# Patient Record
Sex: FEMALE | Race: WHITE | NOT HISPANIC OR LATINO | Employment: FULL TIME | URBAN - METROPOLITAN AREA
[De-identification: names, ages, dates, MRNs, and addresses within clinical notes are randomized per-mention and may not be internally consistent; named-entity substitution may affect disease eponyms.]

---

## 2022-10-25 ENCOUNTER — HOSPITAL ENCOUNTER (EMERGENCY)
Facility: HOSPITAL | Age: 22
Discharge: HOME | End: 2022-10-25
Attending: FAMILY MEDICINE
Payer: COMMERCIAL

## 2022-10-25 VITALS
BODY MASS INDEX: 26.43 KG/M2 | HEART RATE: 80 BPM | HEIGHT: 61 IN | DIASTOLIC BLOOD PRESSURE: 70 MMHG | TEMPERATURE: 98 F | SYSTOLIC BLOOD PRESSURE: 114 MMHG | RESPIRATION RATE: 16 BRPM | WEIGHT: 140 LBS | OXYGEN SATURATION: 98 %

## 2022-10-25 DIAGNOSIS — J02.9 VIRAL PHARYNGITIS: Primary | ICD-10-CM

## 2022-10-25 LAB
FLUAV AG UPPER RESP QL IA.RAPID: NOT DETECTED
FLUBV AG UPPER RESP QL IA.RAPID: NOT DETECTED
SARS-COV-2 RNA RESP QL NAA+PROBE: NOT DETECTED
STREP A PCR (OHS): NOT DETECTED

## 2022-10-25 PROCEDURE — 87651 STREP A DNA AMP PROBE: CPT | Performed by: NURSE PRACTITIONER

## 2022-10-25 PROCEDURE — 0241U COVID/FLU A&B PCR: CPT | Performed by: NURSE PRACTITIONER

## 2022-10-25 PROCEDURE — 99282 EMERGENCY DEPT VISIT SF MDM: CPT

## 2022-10-25 NOTE — DISCHARGE INSTRUCTIONS
Tylenol and ibuprofen for pain if needed. Warm salt water gargles. Increase vitamin C up to 3000mg per day. Zyrtec and/or claritin daily. Over the counter medicine for symptoms relief as needed.

## 2022-10-25 NOTE — ED PROVIDER NOTES
Encounter Date: 10/25/2022       History     Chief Complaint   Patient presents with    Sore Throat     Sore throat & ? fever     23 y/o female presents with sore throat and nasal congestion. No fever. No cough. Symptoms Sunday night/Monday morning. Around others in home who have similar symptoms but have all tested negative.     The history is provided by the patient. No  was used.   Sore Throat   This is a new problem. The sore throat symptoms include sore throat.The current episode started two days ago. The problem has been unchanged. There has been no fever. Associated symptoms include congestion. She has tried nothing for the symptoms. The treatment provided no relief.   Review of patient's allergies indicates:  No Known Allergies  Past Medical History:   Diagnosis Date    Depression      No past surgical history on file.  No family history on file.     Review of Systems   HENT:  Positive for congestion and sore throat.    All other systems reviewed and are negative.    Physical Exam     Initial Vitals [10/25/22 1342]   BP Pulse Resp Temp SpO2   114/70 80 16 98.1 °F (36.7 °C) 98 %      MAP       --         Physical Exam    Nursing note and vitals reviewed.  Constitutional: She appears well-developed and well-nourished.   HENT:   Right Ear: Tympanic membrane and ear canal normal.   Left Ear: Tympanic membrane and ear canal normal.   Mouth/Throat: No oropharyngeal exudate, posterior oropharyngeal edema, posterior oropharyngeal erythema or tonsillar abscesses.   +post nasal drainage   Eyes: Conjunctivae are normal.   Cardiovascular:  Normal rate, regular rhythm and normal heart sounds.           Pulmonary/Chest: Breath sounds normal. No respiratory distress.     Neurological: She is alert and oriented to person, place, and time.   Skin: Skin is warm and dry.   Psychiatric: She has a normal mood and affect.       ED Course   Procedures  Labs Reviewed   COVID/FLU A&B PCR - Normal    Narrative:      The Xpert Xpress SARS-CoV-2/FLU/RSV plus is a rapid, multiplexed real-time PCR test intended for the simultaneous qualitative detection and differentiation of SARS-CoV-2, Influenza A, Influenza B, and respiratory syncytial virus (RSV) viral RNA in either nasopharyngeal swab or nasal swab specimens.         STREP GROUP A BY PCR - Normal    Narrative:     The Xpert Xpress Strep A test is a rapid, qualitative in vitro diagnostic test for the detection of Streptococcus pyogenes (Group A ß-hemolytic Streptococcus, Strep A) in throat swab specimens from patients with signs and symptoms of pharyngitis.            Imaging Results    None          Medications - No data to display  Medical Decision Making:   Clinical Tests:   Lab Tests: Ordered and Reviewed  Additional MDM:   Differential Diagnosis:   Other: The following diagnoses were also considered and will be evaluated: URI, strep and covid.                       Clinical Impression:   Final diagnoses:  [J02.9] Viral pharyngitis (Primary)      ED Disposition Condition    Discharge Stable          ED Prescriptions    None       Follow-up Information       Follow up With Specialties Details Why Contact Info    primary care provider  Call in 1 week As needed, If symptoms worsen              KENNY Bueno  10/25/22 8829

## 2023-04-30 ENCOUNTER — HOSPITAL ENCOUNTER (EMERGENCY)
Facility: HOSPITAL | Age: 23
Discharge: HOME | End: 2023-04-30
Attending: EMERGENCY MEDICINE
Payer: COMMERCIAL

## 2023-04-30 VITALS
TEMPERATURE: 98 F | WEIGHT: 140 LBS | SYSTOLIC BLOOD PRESSURE: 93 MMHG | RESPIRATION RATE: 15 BRPM | HEIGHT: 60 IN | OXYGEN SATURATION: 97 % | DIASTOLIC BLOOD PRESSURE: 50 MMHG | HEART RATE: 79 BPM | BODY MASS INDEX: 27.48 KG/M2

## 2023-04-30 DIAGNOSIS — T50.904A INGESTION OF UNKNOWN DRUG, UNDETERMINED INTENT, INITIAL ENCOUNTER: ICD-10-CM

## 2023-04-30 DIAGNOSIS — F19.10 SUBSTANCE ABUSE: Primary | ICD-10-CM

## 2023-04-30 LAB
ABS NEUT CALC (OHS): 4.49 X10(3)/MCL (ref 2.1–9.2)
ALBUMIN SERPL-MCNC: 4.1 G/DL (ref 3.5–5)
ALBUMIN/GLOB SERPL: 1.1 RATIO (ref 1.1–2)
ALP SERPL-CCNC: 98 UNIT/L (ref 40–150)
ALT SERPL-CCNC: 16 UNIT/L (ref 0–55)
AMPHET UR QL SCN: NEGATIVE
APAP SERPL-MCNC: <17.4 UG/ML (ref 17.4–30)
APPEARANCE UR: CLEAR
AST SERPL-CCNC: 17 UNIT/L (ref 5–34)
B-HCG SERPL QL: NEGATIVE
BARBITURATE SCN PRESENT UR: NEGATIVE
BENZODIAZ UR QL SCN: NEGATIVE
BILIRUB UR QL STRIP.AUTO: NEGATIVE MG/DL
BILIRUBIN DIRECT+TOT PNL SERPL-MCNC: 0.2 MG/DL
BUN SERPL-MCNC: 13.1 MG/DL (ref 7–18.7)
CALCIUM SERPL-MCNC: 9.5 MG/DL (ref 8.4–10.2)
CANNABINOIDS UR QL SCN: POSITIVE
CHLORIDE SERPL-SCNC: 109 MMOL/L (ref 98–107)
CO2 SERPL-SCNC: 22 MMOL/L (ref 22–29)
COCAINE UR QL SCN: NEGATIVE
COLOR UR AUTO: NORMAL
CREAT SERPL-MCNC: 1.03 MG/DL (ref 0.55–1.02)
EOSINOPHIL NFR BLD MANUAL: 0.13 X10(3)/MCL (ref 0–0.9)
EOSINOPHIL NFR BLD MANUAL: 1 % (ref 0–8)
ERYTHROCYTE [DISTWIDTH] IN BLOOD BY AUTOMATED COUNT: 14.1 % (ref 11.5–17)
ETHANOL SERPL-MCNC: 12 MG/DL
FENTANYL UR QL SCN: NEGATIVE
GFR SERPLBLD CREATININE-BSD FMLA CKD-EPI: >60 MLS/MIN/1.73/M2
GLOBULIN SER-MCNC: 3.8 GM/DL (ref 2.4–3.5)
GLUCOSE SERPL-MCNC: 105 MG/DL (ref 74–100)
GLUCOSE UR QL STRIP.AUTO: NEGATIVE MG/DL
HCT VFR BLD AUTO: 40.2 % (ref 37–47)
HGB BLD-MCNC: 12.8 G/DL (ref 12–16)
HYPOCHROMIA BLD QL SMEAR: ABNORMAL
KETONES UR QL STRIP.AUTO: NEGATIVE MG/DL
LEUKOCYTE ESTERASE UR QL STRIP.AUTO: NEGATIVE UNIT/L
LYMPHOCYTES NFR BLD MANUAL: 54 % (ref 13–40)
LYMPHOCYTES NFR BLD MANUAL: 7.13 X10(3)/MCL
MCH RBC QN AUTO: 27.4 PG (ref 27–31)
MCHC RBC AUTO-ENTMCNC: 31.8 G/DL (ref 33–36)
MCV RBC AUTO: 85.9 FL (ref 80–94)
MDMA UR QL SCN: NEGATIVE
MONOCYTES NFR BLD MANUAL: 1.45 X10(3)/MCL (ref 0.1–1.3)
MONOCYTES NFR BLD MANUAL: 11 % (ref 2–11)
NEUTROPHILS NFR BLD MANUAL: 34 % (ref 47–80)
NITRITE UR QL STRIP.AUTO: NEGATIVE
NRBC BLD AUTO-RTO: 0 %
OPIATES UR QL SCN: NEGATIVE
PCP UR QL: NEGATIVE
PH UR STRIP.AUTO: 6 [PH]
PH UR: 6 [PH] (ref 3–11)
PLATELET # BLD AUTO: 466 X10(3)/MCL (ref 130–400)
PLATELET # BLD EST: ABNORMAL 10*3/UL
PMV BLD AUTO: 9.4 FL (ref 7.4–10.4)
POTASSIUM SERPL-SCNC: 3.1 MMOL/L (ref 3.5–5.1)
PROT SERPL-MCNC: 7.9 GM/DL (ref 6.4–8.3)
PROT UR QL STRIP.AUTO: NEGATIVE MG/DL
RBC # BLD AUTO: 4.68 X10(6)/MCL (ref 4.2–5.4)
RBC MORPH BLD: ABNORMAL
RBC UR QL AUTO: NEGATIVE UNIT/L
SALICYLATES SERPL-MCNC: <5 MG/DL
SODIUM SERPL-SCNC: 142 MMOL/L (ref 136–145)
SP GR UR STRIP.AUTO: >=1.03
STOMATOCYTES (OLG): SLIGHT
UROBILINOGEN UR STRIP-ACNC: 0.2 MG/DL
WBC # SPEC AUTO: 13.2 X10(3)/MCL (ref 4.5–11.5)

## 2023-04-30 PROCEDURE — 81025 URINE PREGNANCY TEST: CPT | Performed by: EMERGENCY MEDICINE

## 2023-04-30 PROCEDURE — 80143 DRUG ASSAY ACETAMINOPHEN: CPT | Performed by: EMERGENCY MEDICINE

## 2023-04-30 PROCEDURE — 25000003 PHARM REV CODE 250: Performed by: EMERGENCY MEDICINE

## 2023-04-30 PROCEDURE — 99285 EMERGENCY DEPT VISIT HI MDM: CPT | Mod: 25

## 2023-04-30 PROCEDURE — 81003 URINALYSIS AUTO W/O SCOPE: CPT | Mod: 59 | Performed by: EMERGENCY MEDICINE

## 2023-04-30 PROCEDURE — 85027 COMPLETE CBC AUTOMATED: CPT | Performed by: EMERGENCY MEDICINE

## 2023-04-30 PROCEDURE — 80179 DRUG ASSAY SALICYLATE: CPT | Performed by: EMERGENCY MEDICINE

## 2023-04-30 PROCEDURE — 96360 HYDRATION IV INFUSION INIT: CPT

## 2023-04-30 PROCEDURE — 80307 DRUG TEST PRSMV CHEM ANLYZR: CPT | Performed by: EMERGENCY MEDICINE

## 2023-04-30 PROCEDURE — 93010 EKG 12-LEAD: ICD-10-PCS | Mod: ,,, | Performed by: INTERNAL MEDICINE

## 2023-04-30 PROCEDURE — 93010 ELECTROCARDIOGRAM REPORT: CPT | Mod: ,,, | Performed by: INTERNAL MEDICINE

## 2023-04-30 PROCEDURE — 80053 COMPREHEN METABOLIC PANEL: CPT | Performed by: EMERGENCY MEDICINE

## 2023-04-30 PROCEDURE — 82077 ASSAY SPEC XCP UR&BREATH IA: CPT | Performed by: EMERGENCY MEDICINE

## 2023-04-30 PROCEDURE — 93005 ELECTROCARDIOGRAM TRACING: CPT

## 2023-04-30 RX ORDER — SODIUM CHLORIDE 9 MG/ML
1000 INJECTION, SOLUTION INTRAVENOUS
Status: COMPLETED | OUTPATIENT
Start: 2023-04-30 | End: 2023-04-30

## 2023-04-30 RX ADMIN — SODIUM CHLORIDE 1000 ML: 9 INJECTION, SOLUTION INTRAVENOUS at 03:04

## 2023-04-30 RX ADMIN — SODIUM CHLORIDE 1000 ML: 9 INJECTION, SOLUTION INTRAVENOUS at 12:04

## 2023-04-30 RX ADMIN — POTASSIUM BICARBONATE 25 MEQ: 977.5 TABLET, EFFERVESCENT ORAL at 04:04

## 2023-04-30 NOTE — ED PROVIDER NOTES
"Encounter Date: 4/30/2023       History     Chief Complaint   Patient presents with    Ingestion     " She ate a gummy." States she does not feel right."     23-year-old female ate a THC gummy off the street and got very sleepy and was not feeling well so her mom brought her in.  On arrival she slumped down in the wheelchair and we had to pick her up in the bed, and she aroused with a sternal rub.  Her mom states that she smokes marijuana regularly so isn't sure why she reacted this way to a THC gummy.  Prior to this happening, she was in her usual state of health and her mom states she is not suicidal and did not take anything in an overdose attempt.      Review of patient's allergies indicates:  No Known Allergies  Past Medical History:   Diagnosis Date    Depression      No past surgical history on file.  No family history on file.     Review of Systems   Neurological:  Positive for syncope.   All other systems reviewed and are negative.    Physical Exam     Initial Vitals [04/30/23 0007]   BP Pulse Resp Temp SpO2   (!) 158/109 (!) 134 18 98 °F (36.7 °C) 100 %      MAP       --         Physical Exam    Nursing note and vitals reviewed.  Constitutional: She appears well-developed and well-nourished. She is not diaphoretic. No distress.   Sedated appearing at presentation which resolved with time   HENT:   Head: Normocephalic and atraumatic.   Mouth/Throat: Oropharynx is clear and moist.   Eyes: Conjunctivae are normal. Pupils are equal, round, and reactive to light.   Neck: Neck supple.   Cardiovascular:  Normal rate, regular rhythm, normal heart sounds and intact distal pulses.           Pulmonary/Chest: Breath sounds normal. No respiratory distress. She has no wheezes. She has no rhonchi. She has no rales.   Abdominal: Abdomen is soft. Bowel sounds are normal. She exhibits no distension. There is no abdominal tenderness. There is no guarding.   Musculoskeletal:         General: No tenderness or edema. Normal " range of motion.      Cervical back: Neck supple.     Neurological: She is alert and oriented to person, place, and time.   Skin: Skin is warm and dry. Capillary refill takes less than 2 seconds. No rash noted.   Psychiatric: She has a normal mood and affect. Thought content normal.       ED Course   Procedures  Labs Reviewed   COMPREHENSIVE METABOLIC PANEL - Abnormal; Notable for the following components:       Result Value    Potassium Level 3.1 (*)     Chloride 109 (*)     Glucose Level 105 (*)     Creatinine 1.03 (*)     Globulin 3.8 (*)     All other components within normal limits   ACETAMINOPHEN LEVEL - Abnormal; Notable for the following components:    Acetaminophen Level <17.4 (*)     All other components within normal limits   ALCOHOL,MEDICAL (ETHANOL) - Abnormal; Notable for the following components:    Ethanol Level 12.0 (*)     All other components within normal limits   CBC WITH DIFFERENTIAL - Abnormal; Notable for the following components:    WBC 13.2 (*)     MCHC 31.8 (*)     Platelet 466 (*)     All other components within normal limits   MANUAL DIFFERENTIAL - Abnormal; Notable for the following components:    Neut Man 34 (*)     Lymph Man 54 (*)     Abs Mono 1.452 (*)     Abs Lymp 7.128 (*)     RBC Morph Abnormal (*)     Hypochrom 1+ (*)     Stomatocytes Slight (*)     Platelet Est Increased (*)     All other components within normal limits   DRUG SCREEN, URINE (BEAKER) - Abnormal; Notable for the following components:    Cannabinoids, Urine Positive (*)     All other components within normal limits    Narrative:     Cut off concentrations:    Amphetamines - 1000 ng/ml  Barbiturates - 200 ng/ml  Benzodiazepine - 200 ng/ml  Cannabinoids (THC) - 50 ng/ml  Cocaine - 300 ng/ml  Fentanyl - 1.0 ng/ml  MDMA - 500 ng/ml  Opiates - 300 ng/ml   Phencyclidine (PCP) - 25 ng/ml    Specimen submitted for drug analysis and tested for pH and specific gravity in order to evaluate sample integrity. Suspect tampering  if specific gravity is <1.003 and/or pH is not within the range of 4.5 - 8.0  False negatives may result form substances such as bleach added to urine.  False positives may result for the presence of a substance with similar chemical structure to the drug or its metabolite.    This test provides only a PRELIMINARY analytical test result. A more specific alternate chemical method must be used in order to obtain a confirmed analytical result. Gas chromatography/mass spectrometry (GC/MS) is the preferred confirmatory method. Other chemical confirmation methods are available. Clinical consideration and professional judgement should be applied to any drug of abuse test result, particularly when preliminary positive results are used.    Positive results will be confirmed only at the physicians request. Unconfirmed screening results are to be used only for medical purposes (treatment).        PREGNANCY TEST, URINE RAPID - Normal   CBC W/ AUTO DIFFERENTIAL    Narrative:     The following orders were created for panel order CBC auto differential.  Procedure                               Abnormality         Status                     ---------                               -----------         ------                     CBC with Differential[[id number]]        Abnormal            Final result               Manual Differential[[id number]]          Abnormal            Final result                 Please view results for these tests on the individual orders.   URINALYSIS   SALICYLATE LEVEL   POCT GLUCOSE MONITORING CONTINUOUS          Imaging Results              X-Ray Chest AP Portable (In process)                   X-Rays:   Independently Interpreted Readings:   Other Readings:  Preliminary reading of the chest x-ray is negative  Medications   0.9%  NaCl infusion (1,000 mLs Intravenous New Bag 4/30/23 0032)   0.9%  NaCl infusion (1,000 mLs Intravenous New Bag 4/30/23 0305)   potassium bicarbonate disintegrating tablet 25 mEq (25  mEq Oral Given 4/30/23 0445)     Medical Decision Making:   Initial Assessment:   23-year-old female ate a THC gummy off the street and got very sleepy and was not feeling well so her mom brought her in.  On arrival she slumped down in the wheelchair and we had to pick her up in the bed, and she aroused with a sternal rub.  Her mom states that she smokes marijuana regularly so isn't sure why she reacted this way to a THC gummy.  Prior to this happening, she was in her usual state of health and her mom states she is not suicidal and did not take anything in an overdose attempt.      Differential Diagnosis:   Differential diagnosis includes but is not limited to accidental drug overdose, substance abuse, intentional drug overdose  Clinical Tests:   Lab Tests: Reviewed  Radiological Study: Reviewed  ED Management:  Patient was seen and evaluated in the emergency department with history, physical exam, lab work, chest x-ray.  She was observed in the emergency department on the telemetry monitor for 6 hours and was found to be stable after receiving 2 L of saline.  She is now ambulatory without assistance, mentation clear, stable for discharge home.  Her mom went home to rest but will be returning to pick her up.           ED Course as of 04/30/23 0607   Sun Apr 30, 2023   0225 Blood pressure 130/70, heart rate 97, O2 sat 96% room air.  Patient is sleeping, arouses with stimulation but goes back to sleep. [SH]   0440 Potassium(!): 3.1  Will give a dose of potassium [SH]   0441 X-Ray Chest AP Portable  CXR negative [SH]   0442 Patient is now ambulatory without assistance to the restroom, mentation and speech is clear, vital signs are stable. [SH]   0603 Patient has been observed in the emergency department now for 6 hours.  She is sleepy but arouses with stimulation and is ambulatory to the restroom without assistance.  She is asking to go home at this time.  Her blood pressure on occasion has been on the low side but she  keeps rolling on her side which may give us a falsely low reading.  She is not symptomatic she walks and at this point she is stable for discharge.  She has repeated multiple times that she took was THC for recreational purposes and she had no intention of self-harm.  She does not seem to think she has a substance abuse problem but give her a list of substance abuse detox facilities. [SH]      ED Course User Index  [SH] Kimber Guardado MD                 Clinical Impression:   Final diagnoses:  [T50.944A] Ingestion of unknown drug, undetermined intent, initial encounter  [F19.10] Substance abuse (Primary)        ED Disposition Condition    Discharge Stable          ED Prescriptions    None       Follow-up Information       Follow up With Specialties Details Why Contact Info    PCP  Schedule an appointment as soon as possible for a visit                Kimber Guardado MD  04/30/23 0607

## 2023-04-30 NOTE — Clinical Note
"Evette Arauzparker Jang was seen and treated in our emergency department on 4/30/2023.  She may return to work on 05/03/2023.       If you have any questions or concerns, please don't hesitate to call.      Kimber Guardado MD"

## 2023-06-05 PROBLEM — F32.A ANXIETY AND DEPRESSION: Status: ACTIVE | Noted: 2023-06-05

## 2023-06-05 PROBLEM — F41.9 ANXIETY AND DEPRESSION: Status: ACTIVE | Noted: 2023-06-05

## 2023-08-10 ENCOUNTER — HOSPITAL ENCOUNTER (EMERGENCY)
Facility: HOSPITAL | Age: 23
Discharge: HOME | End: 2023-08-10
Attending: EMERGENCY MEDICINE
Payer: COMMERCIAL

## 2023-08-10 VITALS
BODY MASS INDEX: 27.48 KG/M2 | HEART RATE: 84 BPM | RESPIRATION RATE: 18 BRPM | OXYGEN SATURATION: 100 % | TEMPERATURE: 98 F | DIASTOLIC BLOOD PRESSURE: 65 MMHG | WEIGHT: 140 LBS | HEIGHT: 60 IN | SYSTOLIC BLOOD PRESSURE: 101 MMHG

## 2023-08-10 DIAGNOSIS — J02.9 VIRAL PHARYNGITIS: Primary | ICD-10-CM

## 2023-08-10 PROCEDURE — 0240U COVID/FLU A&B PCR: CPT | Performed by: EMERGENCY MEDICINE

## 2023-08-10 PROCEDURE — 63600175 PHARM REV CODE 636 W HCPCS: Performed by: EMERGENCY MEDICINE

## 2023-08-10 PROCEDURE — 96372 THER/PROPH/DIAG INJ SC/IM: CPT | Performed by: EMERGENCY MEDICINE

## 2023-08-10 PROCEDURE — 99284 EMERGENCY DEPT VISIT MOD MDM: CPT

## 2023-08-10 PROCEDURE — 87651 STREP A DNA AMP PROBE: CPT | Performed by: EMERGENCY MEDICINE

## 2023-08-10 RX ORDER — PREDNISONE 20 MG/1
60 TABLET ORAL DAILY
Qty: 15 TABLET | Refills: 0 | Status: SHIPPED | OUTPATIENT
Start: 2023-08-10 | End: 2023-08-15

## 2023-08-10 RX ORDER — DEXAMETHASONE SODIUM PHOSPHATE 4 MG/ML
8 INJECTION, SOLUTION INTRA-ARTICULAR; INTRALESIONAL; INTRAMUSCULAR; INTRAVENOUS; SOFT TISSUE
Status: COMPLETED | OUTPATIENT
Start: 2023-08-10 | End: 2023-08-10

## 2023-08-10 RX ADMIN — DEXAMETHASONE SODIUM PHOSPHATE 8 MG: 4 INJECTION, SOLUTION INTRA-ARTICULAR; INTRALESIONAL; INTRAMUSCULAR; INTRAVENOUS; SOFT TISSUE at 11:08

## 2023-08-10 NOTE — Clinical Note
"Evette Jang (Noelle) was seen and treated in our emergency department on 8/10/2023.  She may return to work on 08/14/2023.       If you have any questions or concerns, please don't hesitate to call.       RN    "

## 2023-08-10 NOTE — ED PROVIDER NOTES
Encounter Date: 8/10/2023       History     Chief Complaint   Patient presents with    Sore Throat     Pt complaint of worsening sore throat for 2 days     The history is provided by the patient.   Sore Throat   This is a new problem. The current episode started two days ago. The problem has been gradually worsening. There has been no fever. Associated symptoms include swollen glands. Pertinent negatives include no coughing, hoarse voice or shortness of breath.   Denies known sick contacts.    Review of patient's allergies indicates:  No Known Allergies  Past Medical History:   Diagnosis Date    ADHD (attention deficit hyperactivity disorder)     Anxiety     Depression      Past Surgical History:   Procedure Laterality Date     SECTION       Family History   Problem Relation Age of Onset    Depression Mother     Anxiety disorder Mother     No Known Problems Father     No Known Problems Sister      Social History     Tobacco Use    Smoking status: Never    Smokeless tobacco: Never   Substance Use Topics    Alcohol use: Yes     Comment: occasional    Drug use: Not Currently     Types: Marijuana     Review of Systems   Constitutional:  Negative for fever.   HENT:  Positive for sore throat. Negative for hoarse voice.    Respiratory:  Negative for cough and shortness of breath.    Cardiovascular:  Negative for chest pain.   Gastrointestinal:  Negative for nausea.   Genitourinary:  Negative for dysuria.   Musculoskeletal:  Negative for back pain.   Skin:  Negative for rash.   Neurological:  Negative for weakness.   Hematological:  Does not bruise/bleed easily.       Physical Exam     Initial Vitals [08/10/23 1105]   BP Pulse Resp Temp SpO2   101/65 84 18 98.1 °F (36.7 °C) 100 %      MAP       --         Physical Exam    Nursing note and vitals reviewed.  Constitutional: She appears well-developed and well-nourished.   HENT:   Head: Normocephalic and atraumatic.   Right Ear: External ear normal.   Left Ear:  External ear normal.   Mouth/Throat: Uvula is midline and mucous membranes are normal. Oropharyngeal exudate and posterior oropharyngeal erythema present. No posterior oropharyngeal edema or tonsillar abscesses.       Eyes: Conjunctivae and EOM are normal. Pupils are equal, round, and reactive to light.   Neck: Neck supple.   Normal range of motion.  Cardiovascular:  Normal rate, regular rhythm, normal heart sounds and intact distal pulses.           Pulmonary/Chest: Breath sounds normal.   Abdominal: Abdomen is soft. Bowel sounds are normal.   Musculoskeletal:         General: Normal range of motion.      Cervical back: Normal range of motion and neck supple.     Neurological: She is alert and oriented to person, place, and time. GCS score is 15. GCS eye subscore is 4. GCS verbal subscore is 5. GCS motor subscore is 6.   Skin: Skin is warm and dry. Capillary refill takes less than 2 seconds.   Psychiatric: She has a normal mood and affect. Her behavior is normal. Judgment and thought content normal.         ED Course   Procedures  Labs Reviewed   COVID/FLU A&B PCR - Normal    Narrative:     The Xpert Xpress SARS-CoV-2/FLU/RSV plus is a rapid, multiplexed real-time PCR test intended for the simultaneous qualitative detection and differentiation of SARS-CoV-2, Influenza A, Influenza B, and respiratory syncytial virus (RSV) viral RNA in either nasopharyngeal swab or nasal swab specimens.         STREP GROUP A BY PCR - Normal    Narrative:     The Xpert Xpress Strep A test is a rapid, qualitative in vitro diagnostic test for the detection of Streptococcus pyogenes (Group A ß-hemolytic Streptococcus, Strep A) in throat swab specimens from patients with signs and symptoms of pharyngitis.            Imaging Results    None          Medications   dexAMETHasone injection 8 mg (8 mg Intramuscular Given 8/10/23 1118)         Differential includes:  strep, viral pharyngitis, URI, COVID, flu.  Will swab for flu/COVID/strep and  give IM steroids.                     Clinical Impression:   Final diagnoses:  [J02.9] Viral pharyngitis (Primary)        ED Disposition Condition    Discharge Stable          ED Prescriptions       Medication Sig Dispense Start Date End Date Auth. Provider    predniSONE (DELTASONE) 20 MG tablet Take 3 tablets (60 mg total) by mouth once daily. for 5 days 15 tablet 8/10/2023 8/15/2023 Desmond Umana MD          Follow-up Information       Follow up With Specialties Details Why Contact Info    Follow up with your primary care provider in 2 weeks if not improved                 Desmond Umana MD  08/10/23 121

## 2023-08-16 ENCOUNTER — HOSPITAL ENCOUNTER (EMERGENCY)
Facility: HOSPITAL | Age: 23
Discharge: HOME | End: 2023-08-16
Attending: EMERGENCY MEDICINE
Payer: COMMERCIAL

## 2023-08-16 VITALS
WEIGHT: 139 LBS | DIASTOLIC BLOOD PRESSURE: 80 MMHG | OXYGEN SATURATION: 96 % | RESPIRATION RATE: 18 BRPM | SYSTOLIC BLOOD PRESSURE: 120 MMHG | HEIGHT: 60 IN | BODY MASS INDEX: 27.29 KG/M2 | HEART RATE: 98 BPM | TEMPERATURE: 99 F

## 2023-08-16 DIAGNOSIS — Z20.2 POSSIBLE EXPOSURE TO STD: Primary | ICD-10-CM

## 2023-08-16 DIAGNOSIS — N39.0 URINARY TRACT INFECTION WITHOUT HEMATURIA, SITE UNSPECIFIED: ICD-10-CM

## 2023-08-16 LAB
APPEARANCE UR: ABNORMAL
B-HCG SERPL QL: NEGATIVE
BACTERIA #/AREA URNS AUTO: ABNORMAL /HPF
BILIRUB UR QL STRIP.AUTO: NEGATIVE
COLOR UR: YELLOW
GLUCOSE UR QL STRIP.AUTO: NEGATIVE
KETONES UR QL STRIP.AUTO: NEGATIVE
LEUKOCYTE ESTERASE UR QL STRIP.AUTO: ABNORMAL
NITRITE UR QL STRIP.AUTO: NEGATIVE
PH UR STRIP.AUTO: 7.5 [PH]
PROT UR QL STRIP.AUTO: ABNORMAL
RBC #/AREA URNS AUTO: ABNORMAL /HPF
RBC UR QL AUTO: NEGATIVE
SP GR UR STRIP.AUTO: 1.02
SQUAMOUS #/AREA URNS AUTO: ABNORMAL /HPF
UROBILINOGEN UR STRIP-ACNC: 0.2
WBC #/AREA URNS AUTO: >100 /HPF

## 2023-08-16 PROCEDURE — 81001 URINALYSIS AUTO W/SCOPE: CPT

## 2023-08-16 PROCEDURE — 87088 URINE BACTERIA CULTURE: CPT

## 2023-08-16 PROCEDURE — 87591 N.GONORRHOEAE DNA AMP PROB: CPT

## 2023-08-16 PROCEDURE — 63600175 PHARM REV CODE 636 W HCPCS

## 2023-08-16 PROCEDURE — 96372 THER/PROPH/DIAG INJ SC/IM: CPT

## 2023-08-16 PROCEDURE — 99284 EMERGENCY DEPT VISIT MOD MDM: CPT

## 2023-08-16 PROCEDURE — 81025 URINE PREGNANCY TEST: CPT

## 2023-08-16 RX ORDER — CEPHALEXIN 500 MG/1
500 CAPSULE ORAL EVERY 12 HOURS
Qty: 14 CAPSULE | Refills: 0 | Status: SHIPPED | OUTPATIENT
Start: 2023-08-16 | End: 2023-08-23

## 2023-08-16 RX ORDER — CEFTRIAXONE 1 G/1
0.5 INJECTION, POWDER, FOR SOLUTION INTRAMUSCULAR; INTRAVENOUS
Status: COMPLETED | OUTPATIENT
Start: 2023-08-16 | End: 2023-08-16

## 2023-08-16 RX ADMIN — CEFTRIAXONE SODIUM 0.5 G: 1 INJECTION, POWDER, FOR SOLUTION INTRAMUSCULAR; INTRAVENOUS at 06:08

## 2023-08-16 NOTE — ED PROVIDER NOTES
"Encounter Date: 2023       History     Chief Complaint   Patient presents with    Exposure to STD     C/o possible exposure to STD 7 days ago. Denies current symptoms. "Just want to be checked"     The patient is a 23 y.o. female who presents to the Emergency Department with a chief complaint of possible STD exposure. Patient states that her and her partner have been sleeping with other people. She wants to be checked for STD. She does report urinary frequency and dysuria. She denies vaginal discharge or bleeding. Symptoms began 1 week ago and have been constant since onset. Her pain is currently rated as a 0/10 in severity. Symptoms are aggravated with sexual intercourse and there are no alleviating factors. She reports taking nothing prior to arrival with no relief of symptoms. No other reported symptoms at this time.      The history is provided by the patient. No  was used.   Exposure to STD  This is a new problem. The current episode started more than 2 days ago. The problem occurs daily. The problem has not changed since onset.Pertinent negatives include no chest pain, no abdominal pain, no headaches and no shortness of breath. Nothing aggravates the symptoms. Nothing relieves the symptoms. She has tried nothing for the symptoms. The treatment provided no relief.     Review of patient's allergies indicates:  No Known Allergies  Past Medical History:   Diagnosis Date    ADHD (attention deficit hyperactivity disorder)     Anxiety     Depression      Past Surgical History:   Procedure Laterality Date     SECTION       Family History   Problem Relation Age of Onset    Depression Mother     Anxiety disorder Mother     No Known Problems Father     No Known Problems Sister      Social History     Tobacco Use    Smoking status: Never    Smokeless tobacco: Never   Substance Use Topics    Alcohol use: Yes     Comment: occasional    Drug use: Not Currently     Types: Marijuana     Review " of Systems   Respiratory:  Negative for shortness of breath.    Cardiovascular:  Negative for chest pain.   Gastrointestinal:  Negative for abdominal pain.   Genitourinary:  Positive for dysuria and frequency. Negative for pelvic pain and urgency.   Neurological:  Negative for headaches.   All other systems reviewed and are negative.      Physical Exam     Initial Vitals [08/16/23 1624]   BP Pulse Resp Temp SpO2   120/80 98 18 98.6 °F (37 °C) 96 %      MAP       --         Physical Exam    Nursing note and vitals reviewed.  Constitutional: She appears well-developed and well-nourished.   HENT:   Head: Normocephalic.   Right Ear: Hearing and tympanic membrane normal.   Left Ear: Hearing and tympanic membrane normal.   Mouth/Throat: Uvula is midline, oropharynx is clear and moist and mucous membranes are normal.   Cardiovascular:  Normal rate, regular rhythm, normal heart sounds and normal pulses.           Pulmonary/Chest: Effort normal and breath sounds normal.     Neurological: She is alert. GCS eye subscore is 4. GCS verbal subscore is 5. GCS motor subscore is 6.   Skin: Skin is warm, dry and intact. Capillary refill takes less than 2 seconds.         ED Course   Procedures  Labs Reviewed   URINALYSIS, REFLEX TO URINE CULTURE - Abnormal; Notable for the following components:       Result Value    Appearance, UA SL CLOUDY (*)     Protein, UA Trace (*)     Leukocyte Esterase, UA Moderate (*)     All other components within normal limits   URINALYSIS, MICROSCOPIC - Abnormal; Notable for the following components:    Bacteria, UA Few (*)     WBC, UA >100 (*)     Squamous Epithelial Cells, UA Many (*)     All other components within normal limits   PREGNANCY TEST, URINE RAPID - Normal   CHLAMYDIA/GONORRHOEAE(GC), PCR   CULTURE, URINE          Imaging Results    None          Medications   cefTRIAXone injection 0.5 g (0.5 g Intramuscular Given 8/16/23 2917)     Medical Decision Making:   Initial Assessment:   The patient  is a 23 y.o. female who presents to the Emergency Department with a chief complaint of possible STD exposure. Patient states that her and her partner have been sleeping with other people. She wants to be checked for STD. She does report urinary frequency and dysuria. She denies vaginal discharge or bleeding. Symptoms began 1 week ago and have been constant since onset. Her pain is currently rated as a 0/10 in severity. Symptoms are aggravated with sexual intercourse and there are no alleviating factors. She reports taking nothing prior to arrival with no relief of symptoms. No other reported symptoms at this time.    Differential Diagnosis:   Differential diagnosis include but are not limited to urinary tract infection, STD exposure, chlamydia, gonorrhea  Clinical Tests:   Lab Tests: Ordered and Reviewed  ED Management:  MDM  History obtained by patient.   Co-morbidities and/or factors adding to the complexity or risk for the patient?: none  Differential diagnoses: Differential diagnosis include but are not limited to urinary tract infection, STD exposure, chlamydia, gonorrhea  Decision to obtain previous or outside records?: no  Chart Review (nursing home, outside records, CareEverywhere): none  Review of RX medications/new RX prescribed by me?: keflex  My EKG Interpretation: see above  Labs/imaging/other tests obtained/considered (risk/benefits of testing discussed): UA, upt, gc/ch  Labs/tests intepretation: UA with > 100 wbc, 2+ leukocytes  My independent imaging interpretation: none  Treatment/interventions, IV fluids, IV medications: rocephin  Complex management (IV controlled substances, went to OR, DNR, meds requiring monitoring, transfer, etc)?: none  Workup/treatment affected by social determinants of health?: none   Point of care US done/interpretation: none  Consults/radiologist/EMS/social work/family discussion/alternate history: none  Advanced care planning/end of life discussion: none  Shared decision  making: shared decision making with patient  ETOH/smoking/drug cessation discussion: none  Dispo: discharge home.               ED Course as of 08/16/23 1914   Wed Aug 16, 2023   1749 Patient declined treatment for STDs at this time. States she wants to wait until results come back. She understands that they will not be back today. [LM]   1816 Leukocytes, UA(!): Moderate [LM]   1816 WBC, UA(!): >100 [LM]   1816 Bacteria, UA(!): Few [LM]   1816 Patient prefers to wait on GC/CH results for treatment. Patient does have > 100 WBCs, moderate leukocytes. Will dc w/ antibiotics. Will contact patient if STD return positive.  [LM]      ED Course User Index  [LM] Saida Alas NP                 Clinical Impression:   Final diagnoses:  [Z20.2] Possible exposure to STD (Primary)  [N39.0] Urinary tract infection without hematuria, site unspecified        ED Disposition Condition    Discharge Stable          ED Prescriptions       Medication Sig Dispense Start Date End Date Auth. Provider    cephALEXin (KEFLEX) 500 MG capsule Take 1 capsule (500 mg total) by mouth every 12 (twelve) hours. for 7 days 14 capsule 8/16/2023 8/23/2023 Saida Alas NP          Follow-up Information       Follow up With Specialties Details Why Contact Info    Follow up with the [facility] for STD testing  Schedule an appointment as soon as possible for a visit                Saida Alas NP  08/16/23 1914

## 2023-08-17 LAB
C TRACH DNA SPEC QL NAA+PROBE: DETECTED
N GONORRHOEA DNA SPEC QL NAA+PROBE: DETECTED
SOURCE (OHS): ABNORMAL

## 2023-08-18 RX ORDER — DOXYCYCLINE 100 MG/1
100 CAPSULE ORAL EVERY 12 HOURS
Qty: 14 CAPSULE | Refills: 0 | Status: SHIPPED | OUTPATIENT
Start: 2023-08-18 | End: 2023-08-25

## 2023-08-19 LAB
BACTERIA UR CULT: ABNORMAL
BACTERIA UR CULT: ABNORMAL

## 2023-10-05 ENCOUNTER — HOSPITAL ENCOUNTER (EMERGENCY)
Facility: HOSPITAL | Age: 23
Discharge: HOME | End: 2023-10-05
Attending: EMERGENCY MEDICINE
Payer: COMMERCIAL

## 2023-10-05 VITALS
BODY MASS INDEX: 27.29 KG/M2 | WEIGHT: 139 LBS | HEIGHT: 60 IN | HEART RATE: 101 BPM | SYSTOLIC BLOOD PRESSURE: 111 MMHG | OXYGEN SATURATION: 94 % | TEMPERATURE: 99 F | DIASTOLIC BLOOD PRESSURE: 63 MMHG | RESPIRATION RATE: 18 BRPM

## 2023-10-05 DIAGNOSIS — J20.9 ACUTE BRONCHITIS, UNSPECIFIED ORGANISM: Primary | ICD-10-CM

## 2023-10-05 DIAGNOSIS — R05.9 COUGH: ICD-10-CM

## 2023-10-05 PROCEDURE — 87651 STREP A DNA AMP PROBE: CPT | Performed by: EMERGENCY MEDICINE

## 2023-10-05 PROCEDURE — 0240U COVID/FLU A&B PCR: CPT | Performed by: EMERGENCY MEDICINE

## 2023-10-05 PROCEDURE — 99284 EMERGENCY DEPT VISIT MOD MDM: CPT | Mod: 25

## 2023-10-05 RX ORDER — PREDNISONE 20 MG/1
60 TABLET ORAL DAILY
Qty: 15 TABLET | Refills: 0 | Status: SHIPPED | OUTPATIENT
Start: 2023-10-05 | End: 2023-10-10

## 2023-10-05 RX ORDER — ONDANSETRON 8 MG/1
8 TABLET, ORALLY DISINTEGRATING ORAL EVERY 6 HOURS PRN
Qty: 20 TABLET | Refills: 0 | Status: SHIPPED | OUTPATIENT
Start: 2023-10-05 | End: 2023-10-10

## 2023-10-05 RX ORDER — BENZONATATE 200 MG/1
200 CAPSULE ORAL 3 TIMES DAILY PRN
Qty: 30 CAPSULE | Refills: 0 | Status: SHIPPED | OUTPATIENT
Start: 2023-10-05 | End: 2023-10-15

## 2023-10-05 NOTE — ED TRIAGE NOTES
C/o cough fever, bodyaches and sore throat X 2 days. Some nausea as well previously but no vomiting, no diarrhea

## 2023-10-05 NOTE — Clinical Note
"Evette Jang (Noelle) was seen and treated in our emergency department on 10/5/2023.  She may return to work on 10/09/2023.       If you have any questions or concerns, please don't hesitate to call.      RICH Shafer RN"

## 2023-10-05 NOTE — ED PROVIDER NOTES
Encounter Date: 10/5/2023       History     Chief Complaint   Patient presents with    Cough     C/o cough fever, bodyaches and sore throat X 2 days. Some nausea as well previously but no vomiting, no diarrhea     The history is provided by the patient.   Cough  This is a new problem. The current episode started two days ago. The problem occurs constantly. The cough is Productive of sputum. The maximum temperature recorded prior to her arrival was 101 - 101.9 F. Associated symptoms include chills, sore throat and myalgias. Pertinent negatives include no chest pain and no shortness of breath. She has tried nothing for the symptoms.   No known sick contacts.    Review of patient's allergies indicates:  No Known Allergies  Past Medical History:   Diagnosis Date    ADHD (attention deficit hyperactivity disorder)     Anxiety     Depression      Past Surgical History:   Procedure Laterality Date     SECTION       Family History   Problem Relation Age of Onset    Depression Mother     Anxiety disorder Mother     No Known Problems Father     No Known Problems Sister      Social History     Tobacco Use    Smoking status: Never    Smokeless tobacco: Never   Substance Use Topics    Alcohol use: Yes     Comment: occasional    Drug use: Not Currently     Types: Marijuana     Review of Systems   Constitutional:  Positive for chills. Negative for fever.   HENT:  Positive for sore throat.    Respiratory:  Positive for cough. Negative for shortness of breath.    Cardiovascular:  Negative for chest pain.   Gastrointestinal:  Negative for nausea.   Genitourinary:  Negative for dysuria.   Musculoskeletal:  Positive for myalgias. Negative for back pain.   Skin:  Negative for rash.   Neurological:  Negative for weakness.   Hematological:  Does not bruise/bleed easily.       Physical Exam     Initial Vitals [10/05/23 1505]   BP Pulse Resp Temp SpO2   111/63 (!) 115 18 99 °F (37.2 °C) 95 %      MAP       --         Physical  Exam    Nursing note and vitals reviewed.  Constitutional: She appears well-developed and well-nourished.   HENT:   Head: Normocephalic and atraumatic.   Right Ear: External ear normal.   Left Ear: External ear normal.   Eyes: Conjunctivae and EOM are normal. Pupils are equal, round, and reactive to light.   Neck: Neck supple.   Normal range of motion.  Cardiovascular:  Regular rhythm, normal heart sounds and intact distal pulses.   Tachycardia present.         Pulmonary/Chest: Breath sounds normal.   Abdominal: Abdomen is soft. Bowel sounds are normal.   Musculoskeletal:         General: Normal range of motion.      Cervical back: Normal range of motion and neck supple.     Neurological: She is alert and oriented to person, place, and time. GCS score is 15. GCS eye subscore is 4. GCS verbal subscore is 5. GCS motor subscore is 6.   Skin: Skin is warm and dry. Capillary refill takes less than 2 seconds.   Psychiatric: She has a normal mood and affect. Her behavior is normal. Judgment and thought content normal.         ED Course   Procedures  Labs Reviewed   COVID/FLU A&B PCR - Normal    Narrative:     The Xpert Xpress SARS-CoV-2/FLU/RSV plus is a rapid, multiplexed real-time PCR test intended for the simultaneous qualitative detection and differentiation of SARS-CoV-2, Influenza A, Influenza B, and respiratory syncytial virus (RSV) viral RNA in either nasopharyngeal swab or nasal swab specimens.         STREP GROUP A BY PCR - Normal    Narrative:     The Xpert Xpress Strep A test is a rapid, qualitative in vitro diagnostic test for the detection of Streptococcus pyogenes (Group A ß-hemolytic Streptococcus, Strep A) in throat swab specimens from patients with signs and symptoms of pharyngitis.            Imaging Results              X-Ray Chest PA And Lateral (Final result)  Result time 10/05/23 16:42:01      Final result by Oswald Muniz MD (10/05/23 16:42:01)                   Impression:      No acute  cardiopulmonary process identified.      Electronically signed by: Oswald Muniz  Date:    10/05/2023  Time:    16:42               Narrative:    EXAMINATION:  XR CHEST PA AND LATERAL    CLINICAL HISTORY:  Cough, unspecified    TECHNIQUE:  Two-view    COMPARISON:  April 30, 2023.    FINDINGS:  Cardiopericardial silhouette is within normal limits. Lungs are without dense focal or segmental consolidation, congestion, pleural effusion or pneumothorax.                        Wet Read by Desmond Umana MD (10/05/23 16:31:51, [facility] - Emergency Dept, Emergency Medicine)    NAF                                     Medications - No data to display  Medical Decision Making  Amount and/or Complexity of Data Reviewed  Labs: ordered. Decision-making details documented in ED Course.    Risk  OTC drugs.    Differential includes: viral URI, strep, flu, COVID, bronchitis, PNA.  Will obtain flu/strep/COVID swabs.                           Clinical Impression:   Final diagnoses:  [R05.9] Cough  [J20.9] Acute bronchitis, unspecified organism (Primary)        ED Disposition Condition    Discharge Stable          ED Prescriptions       Medication Sig Dispense Start Date End Date Auth. Provider    predniSONE (DELTASONE) 20 MG tablet Take 3 tablets (60 mg total) by mouth once daily. for 5 days 15 tablet 10/5/2023 10/10/2023 Desmond Umana MD    ondansetron (ZOFRAN-ODT) 8 MG TbDL Take 1 tablet (8 mg total) by mouth every 6 (six) hours as needed (nausea). 20 tablet 10/5/2023 10/10/2023 Desmond Umana MD    benzonatate (TESSALON) 200 MG capsule Take 1 capsule (200 mg total) by mouth 3 (three) times daily as needed for Cough. 30 capsule 10/5/2023 10/15/2023 Desmond Umana MD          Follow-up Information       Follow up With Specialties Details Why Contact Info    Follow up with your primary MD in 3-5 days if not improved.  Return to ED for worsening symptoms.                  Desmond Umana MD  10/05/23 9578

## 2024-03-29 ENCOUNTER — HOSPITAL ENCOUNTER (EMERGENCY)
Facility: HOSPITAL | Age: 24
Discharge: HOME | End: 2024-03-29
Attending: INTERNAL MEDICINE
Payer: COMMERCIAL

## 2024-03-29 VITALS
RESPIRATION RATE: 20 BRPM | SYSTOLIC BLOOD PRESSURE: 116 MMHG | BODY MASS INDEX: 27.48 KG/M2 | WEIGHT: 140 LBS | TEMPERATURE: 98 F | OXYGEN SATURATION: 100 % | HEART RATE: 102 BPM | HEIGHT: 60 IN | DIASTOLIC BLOOD PRESSURE: 86 MMHG

## 2024-03-29 DIAGNOSIS — R19.7 NAUSEA VOMITING AND DIARRHEA: Primary | ICD-10-CM

## 2024-03-29 DIAGNOSIS — R11.2 NAUSEA VOMITING AND DIARRHEA: Primary | ICD-10-CM

## 2024-03-29 LAB
APPEARANCE UR: CLEAR
B-HCG SERPL QL: NEGATIVE
BILIRUB UR QL STRIP.AUTO: NEGATIVE
COLOR UR AUTO: YELLOW
FLUAV AG UPPER RESP QL IA.RAPID: NOT DETECTED
FLUBV AG UPPER RESP QL IA.RAPID: NOT DETECTED
GLUCOSE UR QL STRIP.AUTO: NEGATIVE
KETONES UR QL STRIP.AUTO: ABNORMAL
LEUKOCYTE ESTERASE UR QL STRIP.AUTO: NEGATIVE
NITRITE UR QL STRIP.AUTO: NEGATIVE
PH UR STRIP.AUTO: 5.5 [PH]
PROT UR QL STRIP.AUTO: NEGATIVE
RBC UR QL AUTO: NEGATIVE
SARS-COV-2 RNA RESP QL NAA+PROBE: NOT DETECTED
SP GR UR STRIP.AUTO: >=1.03 (ref 1–1.03)
UROBILINOGEN UR STRIP-ACNC: 0.2

## 2024-03-29 PROCEDURE — 25000003 PHARM REV CODE 250: Performed by: PHYSICIAN ASSISTANT

## 2024-03-29 PROCEDURE — 81025 URINE PREGNANCY TEST: CPT | Performed by: PHYSICIAN ASSISTANT

## 2024-03-29 PROCEDURE — 81003 URINALYSIS AUTO W/O SCOPE: CPT | Performed by: PHYSICIAN ASSISTANT

## 2024-03-29 PROCEDURE — 99284 EMERGENCY DEPT VISIT MOD MDM: CPT

## 2024-03-29 PROCEDURE — 0240U COVID/FLU A&B PCR: CPT | Performed by: PHYSICIAN ASSISTANT

## 2024-03-29 RX ORDER — DICYCLOMINE HYDROCHLORIDE 20 MG/1
20 TABLET ORAL 4 TIMES DAILY
Qty: 28 TABLET | Refills: 0 | Status: SHIPPED | OUTPATIENT
Start: 2024-03-29 | End: 2024-04-05

## 2024-03-29 RX ORDER — ONDANSETRON 4 MG/1
8 TABLET, ORALLY DISINTEGRATING ORAL
Status: COMPLETED | OUTPATIENT
Start: 2024-03-29 | End: 2024-03-29

## 2024-03-29 RX ORDER — ONDANSETRON 8 MG/1
8 TABLET, ORALLY DISINTEGRATING ORAL EVERY 8 HOURS PRN
Qty: 15 TABLET | Refills: 0 | Status: SHIPPED | OUTPATIENT
Start: 2024-03-29 | End: 2024-04-03

## 2024-03-29 RX ADMIN — ONDANSETRON 8 MG: 4 TABLET, ORALLY DISINTEGRATING ORAL at 05:03

## 2024-03-29 NOTE — ED PROVIDER NOTES
Encounter Date: 3/29/2024       History     Chief Complaint   Patient presents with    Vomiting     Vomiting and diarrhea onset 1500 today. Low grade fever.      23-year-old female presents to ED for evaluation of nausea vomiting and diarrhea since 1500 today.  Patient reports that she ate [employer] chicken nuggets when she then started having vomiting.  States she was vomited twice.  States she was also having clear diarrhea.  Denies any abdominal pain.  States she has subjective fever.    The history is provided by the patient. No  was used.     Review of patient's allergies indicates:  No Known Allergies  Past Medical History:   Diagnosis Date    ADHD (attention deficit hyperactivity disorder)     Anxiety     Depression      Past Surgical History:   Procedure Laterality Date     SECTION       Family History   Problem Relation Age of Onset    Depression Mother     Anxiety disorder Mother     No Known Problems Father     No Known Problems Sister      Social History     Tobacco Use    Smoking status: Never    Smokeless tobacco: Never   Substance Use Topics    Alcohol use: Yes     Comment: occasional    Drug use: Not Currently     Types: Marijuana     Review of Systems   Constitutional:  Negative for chills, fatigue and fever.   Respiratory:  Negative for shortness of breath.    Cardiovascular:  Negative for chest pain.   Gastrointestinal:  Positive for diarrhea, nausea and vomiting. Negative for abdominal pain.   Genitourinary:  Negative for dysuria, flank pain, frequency, pelvic pain and urgency.   Musculoskeletal:  Negative for myalgias.   All other systems reviewed and are negative.      Physical Exam     Initial Vitals   BP Pulse Resp Temp SpO2   24 1706 24 1706 24 1706 24 1706 24 1708   116/86 (!) 120 18 98.4 °F (36.9 °C) 100 %      MAP       --                Physical Exam    Nursing note and vitals reviewed.  Constitutional: She appears well-developed  and well-nourished.   HENT:   Head: Normocephalic and atraumatic.   Right Ear: Tympanic membrane and external ear normal.   Left Ear: Tympanic membrane and external ear normal.   Mouth/Throat: Uvula is midline, oropharynx is clear and moist and mucous membranes are normal. No trismus in the jaw. No uvula swelling. No oropharyngeal exudate, posterior oropharyngeal edema or posterior oropharyngeal erythema.   Eyes: Conjunctivae are normal. Pupils are equal, round, and reactive to light.   Neck: Neck supple.   Normal range of motion.  Cardiovascular:  Normal rate, regular rhythm and normal heart sounds.           Pulmonary/Chest: Breath sounds normal. She has no wheezes. She has no rhonchi. She has no rales.   Abdominal: Abdomen is soft. Bowel sounds are normal. There is no abdominal tenderness. There is no rebound and no guarding.   Musculoskeletal:         General: Normal range of motion.      Cervical back: Normal range of motion and neck supple.     Neurological: She is alert and oriented to person, place, and time.   Skin: Skin is warm and dry.   Psychiatric: She has a normal mood and affect.         ED Course   Procedures  Labs Reviewed   URINALYSIS, REFLEX TO URINE CULTURE - Abnormal; Notable for the following components:       Result Value    Ketones, UA Trace (*)     All other components within normal limits   PREGNANCY TEST, URINE RAPID - Normal   COVID/FLU A&B PCR - Normal    Narrative:     The Xpert Xpress SARS-CoV-2/FLU/RSV plus is a rapid, multiplexed real-time PCR test intended for the simultaneous qualitative detection and differentiation of SARS-CoV-2, Influenza A, Influenza B, and respiratory syncytial virus (RSV) viral RNA in either nasopharyngeal swab or nasal swab specimens.                Imaging Results    None          Medications   ondansetron disintegrating tablet 8 mg (8 mg Oral Given 3/29/24 1355)     Medical Decision Making  23-year-old female presents to ED for evaluation of nausea  vomiting and diarrhea since 1500 today.  Patient reports that she ate [employer] chicken nuggets when she then started having vomiting.  States she was vomited twice.  States she was also having clear diarrhea.  Denies any abdominal pain.  States she has subjective fever.    Differential diagnosis includes but isn't limited to UTI, viral gastroenteritis, food poisoning    Amount and/or Complexity of Data Reviewed  Labs: ordered. Decision-making details documented in ED Course.  Discussion of management or test interpretation with external provider(s): Patient afebrile and in no acute distress.  Abdomen is soft nontender nonsurgical.  Patient reports to multiple episodes of vomiting and diarrhea prior to arrival.  Patient has been eating and drinking while in the ED.  Has a no vomiting here.  Given Zofran.  Discussed hydration and symptomatic care.  Return ED precautions given.  Patient verbalizes understanding.    Risk  OTC drugs.  Prescription drug management.               ED Course as of 03/29/24 1842   Fri Mar 29, 2024   1838 hCG Qualitative, Urine: Negative [SL]   1838 Influenza A, Molecular: Not Detected [SL]   1839 Influenza B, Molecular: Not Detected [SL]   1839 SARS-CoV2 (COVID-19) Qualitative PCR: Not Detected [SL]   1839 Ketones, UA(!): Trace [SL]      ED Course User Index  [SL] Thais Tam PA                           Clinical Impression:  Final diagnoses:  [R11.2, R19.7] Nausea vomiting and diarrhea (Primary)          ED Disposition Condition    Discharge Stable          ED Prescriptions       Medication Sig Dispense Start Date End Date Auth. Provider    dicyclomine (BENTYL) 20 mg tablet Take 1 tablet (20 mg total) by mouth 4 (four) times daily. for 7 days 28 tablet 3/29/2024 4/5/2024 Thais Tam PA    ondansetron (ZOFRAN-ODT) 8 MG TbDL Take 1 tablet (8 mg total) by mouth every 8 (eight) hours as needed. 15 tablet 3/29/2024 4/3/2024 Thais Tam PA          Follow-up Information       Follow up With  Specialties Details Why Contact Info    John Garcia MD Family Medicine   [address] [place] [id number]  [phone]               Thais Tam PA  03/29/24 7356

## 2024-03-29 NOTE — Clinical Note
"Evette Arauze" Laine was seen and treated in our emergency department on 3/29/2024.  She may return to work on 03/31/2024.       If you have any questions or concerns, please don't hesitate to call.      Thais Tam PA"

## 2024-03-29 NOTE — DISCHARGE INSTRUCTIONS
Drink plenty of fluids. Eat a bland diet over the next 2-3 days. Use zofran for nausea and vomiting. Take bentyl for abdominal pain.

## 2024-04-05 ENCOUNTER — HOSPITAL ENCOUNTER (EMERGENCY)
Facility: HOSPITAL | Age: 24
Discharge: HOME | End: 2024-04-05
Attending: EMERGENCY MEDICINE
Payer: COMMERCIAL

## 2024-04-05 VITALS
RESPIRATION RATE: 16 BRPM | SYSTOLIC BLOOD PRESSURE: 108 MMHG | TEMPERATURE: 100 F | DIASTOLIC BLOOD PRESSURE: 79 MMHG | HEART RATE: 91 BPM | WEIGHT: 140 LBS | BODY MASS INDEX: 26.43 KG/M2 | OXYGEN SATURATION: 98 % | HEIGHT: 61 IN

## 2024-04-05 DIAGNOSIS — R05.9 COUGH: ICD-10-CM

## 2024-04-05 DIAGNOSIS — J40 BRONCHITIS: Primary | ICD-10-CM

## 2024-04-05 LAB
FLUAV AG UPPER RESP QL IA.RAPID: NOT DETECTED
FLUBV AG UPPER RESP QL IA.RAPID: NOT DETECTED
RSV A 5' UTR RNA NPH QL NAA+PROBE: NOT DETECTED
SARS-COV-2 RNA RESP QL NAA+PROBE: NOT DETECTED
STREP A PCR (OHS): NOT DETECTED

## 2024-04-05 PROCEDURE — 99284 EMERGENCY DEPT VISIT MOD MDM: CPT

## 2024-04-05 PROCEDURE — 0241U COVID/RSV/FLU A&B PCR: CPT | Performed by: EMERGENCY MEDICINE

## 2024-04-05 PROCEDURE — 87651 STREP A DNA AMP PROBE: CPT | Performed by: EMERGENCY MEDICINE

## 2024-04-05 RX ORDER — BENZONATATE 100 MG/1
100 CAPSULE ORAL 3 TIMES DAILY PRN
Qty: 20 CAPSULE | Refills: 0 | Status: SHIPPED | OUTPATIENT
Start: 2024-04-05 | End: 2024-04-15

## 2024-04-05 RX ORDER — NAPROXEN 500 MG/1
500 TABLET ORAL 2 TIMES DAILY WITH MEALS
Qty: 20 TABLET | Refills: 0 | Status: SHIPPED | OUTPATIENT
Start: 2024-04-05

## 2024-04-05 NOTE — Clinical Note
"Evette Jang (Noelle) was seen and treated in our emergency department on 4/5/2024.  She may return to work on 04/08/2024.       If you have any questions or concerns, please don't hesitate to call.       LPN    "

## 2024-04-05 NOTE — ED PROVIDER NOTES
Encounter Date: 2024       History     Chief Complaint   Patient presents with    Cough     Pt complaint of a cough for 2 days     23-year-old female states she has had a cough for the past 2 days.  She states she felt hot at home but did not check her temperature.  She states her temperature was 100° when she checked in today.  She has no runny nose sore throat nausea vomiting diarrhea headache nor neck pain.  She states her mom had bronchitis last week.  C/O bilat cp with cough      Review of patient's allergies indicates:  No Known Allergies  Past Medical History:   Diagnosis Date    ADHD (attention deficit hyperactivity disorder)     Anxiety     Depression      Past Surgical History:   Procedure Laterality Date     SECTION       Family History   Problem Relation Age of Onset    Depression Mother     Anxiety disorder Mother     No Known Problems Father     No Known Problems Sister      Social History     Tobacco Use    Smoking status: Never    Smokeless tobacco: Never   Substance Use Topics    Alcohol use: Yes     Comment: occasional    Drug use: Not Currently     Types: Marijuana     Review of Systems   All other systems reviewed and are negative.      Physical Exam     Initial Vitals [24 1116]   BP Pulse Resp Temp SpO2   102/77 102 18 100 °F (37.8 °C) 98 %      MAP       --         Physical Exam    Nursing note and vitals reviewed.  Constitutional: She appears well-developed. No distress.   HENT:   Mouth/Throat: Oropharynx is clear and moist.   Eyes: Conjunctivae are normal.   Cardiovascular:  Normal rate, regular rhythm and normal heart sounds.     Exam reveals no gallop and no friction rub.       No murmur heard.  Pulmonary/Chest: Breath sounds normal. No respiratory distress. She has no wheezes. She has no rhonchi. She has no rales.   Abdominal: Abdomen is soft. Bowel sounds are normal. She exhibits no distension. There is no abdominal tenderness. There is no guarding.   Musculoskeletal:          General: No edema.     Lymphadenopathy:     She has no cervical adenopathy.   Neurological: She is alert. She displays a negative Romberg sign. Coordination and gait normal. GCS eye subscore is 4. GCS verbal subscore is 5. GCS motor subscore is 6.   Skin: Skin is warm.   Psychiatric: She has a normal mood and affect.         ED Course   Procedures  Labs Reviewed   COVID/RSV/FLU A&B PCR - Normal    Narrative:     The Xpert Xpress SARS-CoV-2/FLU/RSV plus is a rapid, multiplexed real-time PCR test intended for the simultaneous qualitative detection and differentiation of SARS-CoV-2, Influenza A, Influenza B, and respiratory syncytial virus (RSV) viral RNA in either nasopharyngeal swab or nasal swab specimens.         STREP GROUP A BY PCR - Normal    Narrative:     The Xpert Xpress Strep A test is a rapid, qualitative in vitro diagnostic test for the detection of Streptococcus pyogenes (Group A ß-hemolytic Streptococcus, Strep A) in throat swab specimens from patients with signs and symptoms of pharyngitis.            Imaging Results    None          Medications - No data to display  Medical Decision Making  Medical Decision Making  Problem list/ differential diagnosis including but not limited to:  Flu, covid, strep     Patient's chronic illnesses impacting care: na        Diagnostic test considered but not ordered: none          Discussion of case with external qualified healthcare professionals:  Not applicable        Review of external notes( inpt, ems, NH, clinic): in [facility]        Decision rules/scores:     Medications reviewed: all    Discharge prescriptions: naproxen, tessalon     Social variables possible impacting patient's healthcare: none     Code status/discussion     Shared decision making:     Consideration for admission versus discharge: stable for discharge      Amount and/or Complexity of Data Reviewed  Labs: ordered. Decision-making details documented in ED Course.       Risk  Prescription drug  management.        Amount and/or Complexity of Data Reviewed  External Data Reviewed: notes.  Labs: ordered.  Radiology: ordered.                                      Clinical Impression:  Final diagnoses:  [R05.9] Cough  [J40] Bronchitis (Primary)          ED Disposition Condition    Discharge Stable          ED Prescriptions       Medication Sig Dispense Start Date End Date Auth. Provider    benzonatate (TESSALON) 100 MG capsule Take 1 capsule (100 mg total) by mouth 3 (three) times daily as needed for Cough. 20 capsule 4/5/2024 4/15/2024 José Luis Jin Jr., MD    naproxen (NAPROSYN) 500 MG tablet Take 1 tablet (500 mg total) by mouth 2 (two) times daily with meals. 20 tablet 4/5/2024 -- José Luis Jin Jr., MD          Follow-up Information       Follow up With Specialties Details Why Contact Info    John Garcia MD Family Medicine In 1 week  [address] [place] [address]  [phone]      John Garcia MD Family Medicine In 3 days  [address] [place] [address]  [phone]               José Luis Jin Jr., MD  04/05/24 7872

## 2024-05-01 ENCOUNTER — HOSPITAL ENCOUNTER (EMERGENCY)
Facility: HOSPITAL | Age: 24
Discharge: HOME | End: 2024-05-01
Attending: EMERGENCY MEDICINE
Payer: COMMERCIAL

## 2024-05-01 VITALS
HEIGHT: 61 IN | WEIGHT: 152 LBS | RESPIRATION RATE: 18 BRPM | OXYGEN SATURATION: 98 % | HEART RATE: 83 BPM | SYSTOLIC BLOOD PRESSURE: 114 MMHG | TEMPERATURE: 99 F | BODY MASS INDEX: 28.7 KG/M2 | DIASTOLIC BLOOD PRESSURE: 88 MMHG

## 2024-05-01 DIAGNOSIS — N89.8 VAGINAL DISCHARGE: ICD-10-CM

## 2024-05-01 DIAGNOSIS — N89.8 VAGINAL ITCHING: Primary | ICD-10-CM

## 2024-05-01 LAB
APPEARANCE UR: ABNORMAL
B-HCG SERPL QL: NEGATIVE
BILIRUB UR QL STRIP.AUTO: NEGATIVE
C TRACH DNA SPEC QL NAA+PROBE: NOT DETECTED
COLOR UR AUTO: ABNORMAL
GLUCOSE UR QL STRIP.AUTO: NEGATIVE
KETONES UR QL STRIP.AUTO: NEGATIVE
LEUKOCYTE ESTERASE UR QL STRIP.AUTO: NEGATIVE
N GONORRHOEA DNA SPEC QL NAA+PROBE: NOT DETECTED
NITRITE UR QL STRIP.AUTO: NEGATIVE
PH UR STRIP.AUTO: 6 [PH]
PROT UR QL STRIP.AUTO: NEGATIVE
RBC UR QL AUTO: NEGATIVE
SOURCE (OHS): NORMAL
SP GR UR STRIP.AUTO: 1.02 (ref 1–1.03)
T VAGINALIS GENITAL QL WET PREP: NORMAL
UROBILINOGEN UR STRIP-ACNC: 0.2

## 2024-05-01 PROCEDURE — 87591 N.GONORRHOEAE DNA AMP PROB: CPT

## 2024-05-01 PROCEDURE — 87491 CHLMYD TRACH DNA AMP PROBE: CPT

## 2024-05-01 PROCEDURE — 87210 SMEAR WET MOUNT SALINE/INK: CPT

## 2024-05-01 PROCEDURE — 81003 URINALYSIS AUTO W/O SCOPE: CPT

## 2024-05-01 PROCEDURE — 81025 URINE PREGNANCY TEST: CPT

## 2024-05-01 PROCEDURE — 99283 EMERGENCY DEPT VISIT LOW MDM: CPT

## 2024-05-01 RX ORDER — ASPIRIN 325 MG
1 TABLET, DELAYED RELEASE (ENTERIC COATED) ORAL NIGHTLY
Qty: 7 EACH | Refills: 0 | Status: SHIPPED | OUTPATIENT
Start: 2024-05-01 | End: 2024-05-08

## 2024-05-01 RX ORDER — METRONIDAZOLE 500 MG/1
500 TABLET ORAL EVERY 12 HOURS
Qty: 14 TABLET | Refills: 0 | Status: SHIPPED | OUTPATIENT
Start: 2024-05-01 | End: 2024-05-08

## 2024-05-01 NOTE — DISCHARGE INSTRUCTIONS
For vaginal itching and discharge, use clotrimazole suppositories nightly for 7 days.  Also recommend taking metronidazole 2 times daily for 7 days.      Trichomonas, gonorrhea, chlamydia labs are all send out labs and will not be resulted until 24-36 hours.  Please download the my [facility] and follow up for results.  Please return to ER for appropriate treatment if results are positive.      For any additional STI testing, please go to the [facility].    Return to ER for any worsening symptoms.

## 2024-05-01 NOTE — ED PROVIDER NOTES
"Encounter Date: 2024       History     Chief Complaint   Patient presents with    Vaginal Itching     C/o vaginal itching and possible yeast infection X 10 days. States she would "like to get checked for everything"     See MDM for details     The history is provided by the patient.     Review of patient's allergies indicates:  No Known Allergies  Past Medical History:   Diagnosis Date    ADHD (attention deficit hyperactivity disorder)     Anxiety     Depression      Past Surgical History:   Procedure Laterality Date     SECTION       Family History   Problem Relation Name Age of Onset    Depression Mother      Anxiety disorder Mother      No Known Problems Father      No Known Problems Sister       Social History     Tobacco Use    Smoking status: Never    Smokeless tobacco: Never   Substance Use Topics    Alcohol use: Yes     Comment: occasional    Drug use: Not Currently     Types: Marijuana     Review of Systems   Constitutional:  Negative for chills and fever.   Gastrointestinal:  Negative for abdominal pain, nausea and vomiting.   Genitourinary:  Positive for vaginal discharge. Negative for dysuria, genital sores, pelvic pain and vaginal bleeding.   All other systems reviewed and are negative.      Physical Exam     Initial Vitals [24 1129]   BP Pulse Resp Temp SpO2   114/88 83 18 98.6 °F (37 °C) 98 %      MAP       --         Physical Exam    Nursing note and vitals reviewed.  Constitutional: She appears well-developed and well-nourished. No distress.   HENT:   Head: Normocephalic and atraumatic.   Eyes: Conjunctivae and EOM are normal.   Neck:   Normal range of motion.  Cardiovascular:  Normal rate, regular rhythm and normal heart sounds.           Pulmonary/Chest: Breath sounds normal. No respiratory distress.   Abdominal: Abdomen is soft.   Genitourinary:    Genitourinary Comments: Declined     Musculoskeletal:         General: Normal range of motion.      Cervical back: Normal range of " motion.     Neurological: She is alert and oriented to person, place, and time. GCS score is 15. GCS eye subscore is 4. GCS verbal subscore is 5. GCS motor subscore is 6.   Skin: Skin is warm and dry. Capillary refill takes less than 2 seconds.   Psychiatric: She has a normal mood and affect. Thought content normal.         ED Course   Procedures  Labs Reviewed   URINALYSIS, REFLEX TO URINE CULTURE - Abnormal; Notable for the following components:       Result Value    Appearance, UA SL CLOUDY (*)     All other components within normal limits   PREGNANCY TEST, URINE RAPID - Normal   TRICHOMONAS PREP WET [facility]   CHLAMYDIA/GONORRHOEAE(GC), PCR          Imaging Results    None          Medications - No data to display  Medical Decision Making  24-year-old female presents to the ER for evaluation of abnormal vaginal discharge greater than 1 week.  Patient is unsure of exact onset.  She reports that she had a new sexual partner about 1 month ago, however, is unsure of the STI status or history.  Patient denies any personal history of STI.  Patient reports that she has been having a thick, light green discharge.  She also reports an abnormal odor which she is unable to describe.  She denies any vaginal bleeding.  She denies any dysuria.  She reports that she did buy Monistat over-the-counter, however, she has not used it.  She denies any fever or chills.  She denies abdominal pain.  She denies any nausea or vomiting.    UA today was unremarkable.  UPT was negative.  Discussed with the patient that Trichomonas, gonorrhea, and chlamydia are send out labs and she will need to tell of the my [facility] to follow her results and return for appropriate testing.  Patient verbalized her understanding.  Today, we will recommend that she uses the Monistat over-the-counter.  We will also treat for suspected BV based on clinical history.  Recommend that she follows her results and return to ER for any further testing.  Patient  verbalized her understanding.  She was also given resources to the [facility] clinic for further STI testing.  Patient verbalized her understanding.    Amount and/or Complexity of Data Reviewed  Labs: ordered. Decision-making details documented in ED Course.      Additional MDM:   Differential Diagnosis:   Other: The following diagnoses were also considered and will be evaluated: Bacterial vaginosis, UTI and Chlamydia.            ED Course as of 05/01/24 1317   Wed May 01, 2024   1255 hCG Qualitative, Urine: Negative [LM]   1255 UA unremarkable [LM]   1313 Trichomonas and chlamydia, gonorrhea are a send out lab.  Patient is aware. [LM]      ED Course User Index  [LM] Nazia Tejada PA                           Clinical Impression:  Final diagnoses:  [N89.8] Vaginal itching (Primary)  [N89.8] Vaginal discharge          ED Disposition Condition    Discharge Stable          ED Prescriptions       Medication Sig Dispense Start Date End Date Auth. Provider    metroNIDAZOLE (FLAGYL) 500 MG tablet Take 1 tablet (500 mg total) by mouth every 12 (twelve) hours. for 7 days 14 tablet 5/1/2024 5/8/2024 Nazia Tejada PA    clotrimazole (LOTRIMIN) 1 % [facility] [facility] 1 suppository (1 applicator total) vaginally nightly. for 7 days 7 each 5/1/2024 5/8/2024 Nazia Tejada PA          Follow-up Information       Follow up With Specialties Details Why Contact Info    John Garcia MD Family Medicine Schedule an appointment as soon as possible for a visit  As needed, If symptoms worsen [address] [facility] [address]  [phone]      [facility]  Schedule an appointment as soon as possible for a visit  for thorough STD testing              Nazia Tejada PA  05/01/24 131

## 2024-10-29 ENCOUNTER — OFFICE VISIT (OUTPATIENT)
Dept: FAMILY MEDICINE | Facility: CLINIC | Age: 24
End: 2024-10-29
Payer: MEDICAID

## 2024-10-29 VITALS
HEIGHT: 61 IN | RESPIRATION RATE: 20 BRPM | DIASTOLIC BLOOD PRESSURE: 66 MMHG | SYSTOLIC BLOOD PRESSURE: 114 MMHG | TEMPERATURE: 99 F | WEIGHT: 163.81 LBS | OXYGEN SATURATION: 98 % | HEART RATE: 91 BPM | BODY MASS INDEX: 30.93 KG/M2

## 2024-10-29 DIAGNOSIS — R10.84 GENERALIZED ABDOMINAL PAIN: ICD-10-CM

## 2024-10-29 DIAGNOSIS — T63.421A FIRE ANT BITE, ACCIDENTAL OR UNINTENTIONAL, INITIAL ENCOUNTER: ICD-10-CM

## 2024-10-29 DIAGNOSIS — Z11.3 SCREEN FOR STD (SEXUALLY TRANSMITTED DISEASE): ICD-10-CM

## 2024-10-29 DIAGNOSIS — F41.9 ANXIETY AND DEPRESSION: Primary | ICD-10-CM

## 2024-10-29 DIAGNOSIS — F32.A ANXIETY AND DEPRESSION: Primary | ICD-10-CM

## 2024-10-29 PROCEDURE — 3078F DIAST BP <80 MM HG: CPT | Mod: CPTII,,, | Performed by: NURSE PRACTITIONER

## 2024-10-29 PROCEDURE — 3008F BODY MASS INDEX DOCD: CPT | Mod: CPTII,,, | Performed by: NURSE PRACTITIONER

## 2024-10-29 PROCEDURE — 3074F SYST BP LT 130 MM HG: CPT | Mod: CPTII,,, | Performed by: NURSE PRACTITIONER

## 2024-10-29 PROCEDURE — 1159F MED LIST DOCD IN RCRD: CPT | Mod: CPTII,,, | Performed by: NURSE PRACTITIONER

## 2024-10-29 PROCEDURE — 99214 OFFICE O/P EST MOD 30 MIN: CPT | Mod: PBBFAC,PN | Performed by: NURSE PRACTITIONER

## 2024-10-29 PROCEDURE — 99204 OFFICE O/P NEW MOD 45 MIN: CPT | Mod: S$PBB,,, | Performed by: NURSE PRACTITIONER

## 2024-10-29 PROCEDURE — 1160F RVW MEDS BY RX/DR IN RCRD: CPT | Mod: CPTII,,, | Performed by: NURSE PRACTITIONER

## 2024-10-29 RX ORDER — SERTRALINE HYDROCHLORIDE 100 MG/1
100 TABLET, FILM COATED ORAL DAILY
Qty: 90 TABLET | Refills: 1 | Status: SHIPPED | OUTPATIENT
Start: 2024-10-29 | End: 2025-10-29

## 2024-10-29 RX ORDER — VALACYCLOVIR HYDROCHLORIDE 500 MG/1
500 TABLET, FILM COATED ORAL DAILY
COMMUNITY
Start: 2024-10-05

## 2024-10-29 RX ORDER — TRIAMCINOLONE ACETONIDE 1 MG/G
CREAM TOPICAL 2 TIMES DAILY
Qty: 80 G | Refills: 0 | Status: SHIPPED | OUTPATIENT
Start: 2024-10-29

## 2024-11-18 ENCOUNTER — HOSPITAL ENCOUNTER (OUTPATIENT)
Dept: RADIOLOGY | Facility: HOSPITAL | Age: 24
Discharge: HOME | End: 2024-11-18
Attending: NURSE PRACTITIONER
Payer: MEDICAID

## 2024-11-18 DIAGNOSIS — R10.84 GENERALIZED ABDOMINAL PAIN: ICD-10-CM

## 2024-11-18 PROCEDURE — 76700 US EXAM ABDOM COMPLETE: CPT | Mod: TC

## 2024-11-29 ENCOUNTER — HOSPITAL ENCOUNTER (EMERGENCY)
Facility: HOSPITAL | Age: 24
Discharge: HOME | End: 2024-11-29
Attending: EMERGENCY MEDICINE
Payer: COMMERCIAL

## 2024-11-29 VITALS
DIASTOLIC BLOOD PRESSURE: 87 MMHG | HEIGHT: 60 IN | OXYGEN SATURATION: 98 % | RESPIRATION RATE: 18 BRPM | BODY MASS INDEX: 30.82 KG/M2 | HEART RATE: 85 BPM | SYSTOLIC BLOOD PRESSURE: 120 MMHG | WEIGHT: 157 LBS | TEMPERATURE: 99 F

## 2024-11-29 DIAGNOSIS — R05.9 COUGH: ICD-10-CM

## 2024-11-29 DIAGNOSIS — B34.9 VIRAL SYNDROME: Primary | ICD-10-CM

## 2024-11-29 LAB
B-HCG UR QL: NEGATIVE
FLUAV AG UPPER RESP QL IA.RAPID: NOT DETECTED
FLUBV AG UPPER RESP QL IA.RAPID: NOT DETECTED
RSV A 5' UTR RNA NPH QL NAA+PROBE: NOT DETECTED
SARS-COV-2 RNA RESP QL NAA+PROBE: NOT DETECTED

## 2024-11-29 PROCEDURE — 25000003 PHARM REV CODE 250

## 2024-11-29 PROCEDURE — 0241U COVID/RSV/FLU A&B PCR: CPT

## 2024-11-29 PROCEDURE — 99284 EMERGENCY DEPT VISIT MOD MDM: CPT | Mod: 25

## 2024-11-29 PROCEDURE — 81025 URINE PREGNANCY TEST: CPT

## 2024-11-29 RX ORDER — IBUPROFEN 600 MG/1
600 TABLET ORAL EVERY 6 HOURS PRN
Qty: 20 TABLET | Refills: 0 | Status: SHIPPED | OUTPATIENT
Start: 2024-11-29

## 2024-11-29 RX ORDER — PROMETHAZINE HYDROCHLORIDE AND CODEINE PHOSPHATE 6.25; 1 MG/5ML; MG/5ML
5 SOLUTION ORAL EVERY 6 HOURS PRN
Qty: 118 ML | Refills: 0 | Status: SHIPPED | OUTPATIENT
Start: 2024-11-29 | End: 2024-11-29 | Stop reason: ALTCHOICE

## 2024-11-29 RX ORDER — ACETAMINOPHEN 500 MG
1000 TABLET ORAL
Status: COMPLETED | OUTPATIENT
Start: 2024-11-29 | End: 2024-11-29

## 2024-11-29 RX ORDER — PROMETHAZINE HYDROCHLORIDE AND DEXTROMETHORPHAN HYDROBROMIDE 6.25; 15 MG/5ML; MG/5ML
5 SYRUP ORAL EVERY 6 HOURS PRN
Qty: 118 ML | Refills: 0 | Status: SHIPPED | OUTPATIENT
Start: 2024-11-29

## 2024-11-29 RX ADMIN — ACETAMINOPHEN 1000 MG: 500 TABLET ORAL at 04:11

## 2024-11-29 NOTE — ED PROVIDER NOTES
Encounter Date: 2024       History     Chief Complaint   Patient presents with    Cough     Pt complaint  of cough for 5 days, presenting with a low grade fever and pain with breathing since yesterday and onset nausea today     24 y.o. White female with a history of ADHD, Anxiety, and Depression presents to Emergency Department with a chief complaint of cough. Symptoms began 5 days ago and have been constant since onset. Patient's son tested positive for RSV and Rhinovirus. Was seen at [facility] for same complaints recently. Associated symptoms include low grade fever and pleuritic chest pain. Symptoms are aggravated with coughing and there are no alleviating factors. The patient denies Wheezing, dizziness, vomiting, abdominal pain, or dizziness. No other reported symptoms at this time      The history is provided by the patient. No  was used.   Cough  This is a new problem. The current episode started several days ago. The problem occurs every few minutes. The problem has been unchanged. The cough is Non-productive. There has been no fever. Associated symptoms include chest pain. Pertinent negatives include no chills, no sweats, no headaches, no rhinorrhea, no sore throat, no myalgias and no wheezing.     Review of patient's allergies indicates:  No Known Allergies  Past Medical History:   Diagnosis Date    ADHD (attention deficit hyperactivity disorder)     Anxiety     Depression      Past Surgical History:   Procedure Laterality Date     SECTION       Family History   Problem Relation Name Age of Onset    Depression Mother      Anxiety disorder Mother      No Known Problems Father      No Known Problems Sister       Social History     Tobacco Use    Smoking status: Never    Smokeless tobacco: Never   Substance Use Topics    Alcohol use: Yes     Comment: occasional    Drug use: Not Currently     Types: Marijuana     Review of Systems   Constitutional:  Positive for fever.  Negative for chills and diaphoresis.   HENT:  Negative for rhinorrhea and sore throat.    Eyes:  Negative for photophobia and visual disturbance.   Respiratory:  Positive for cough. Negative for wheezing and stridor.    Cardiovascular:  Positive for chest pain. Negative for palpitations and leg swelling.   Gastrointestinal:  Negative for abdominal pain, anal bleeding, blood in stool, rectal pain and vomiting.   Musculoskeletal:  Negative for back pain, gait problem, joint swelling and myalgias.   Neurological:  Negative for dizziness, syncope, weakness and headaches.   All other systems reviewed and are negative.      Physical Exam     Initial Vitals [11/29/24 1606]   BP Pulse Resp Temp SpO2   120/87 85 18 100 °F (37.8 °C) 98 %      MAP       --         Physical Exam    Nursing note and vitals reviewed.  Constitutional: She appears well-developed and well-nourished. She is not diaphoretic. She is cooperative.  Non-toxic appearance. No distress.   HENT:   Head: Normocephalic and atraumatic.   Right Ear: External ear normal.   Left Ear: External ear normal.   Nose: Nose normal.   Eyes: Conjunctivae and EOM are normal. Pupils are equal, round, and reactive to light.   Neck: Neck supple.   Normal range of motion.  Cardiovascular:  Normal rate, regular rhythm, S1 normal, S2 normal, normal heart sounds, intact distal pulses and normal pulses.           Pulmonary/Chest: Effort normal and breath sounds normal. No accessory muscle usage. No tachypnea and no bradypnea. No respiratory distress. She has no decreased breath sounds. She has no wheezes. She has no rhonchi. She has no rales. She exhibits tenderness. She exhibits no edema, no deformity, no swelling and no retraction.     In no respiratory distress, able to speak in complete sentences. Reports tenderness to outlined area with coughing.    Abdominal: Abdomen is soft. Bowel sounds are normal. She exhibits no distension. There is no abdominal tenderness. There is no  rebound.   Musculoskeletal:         General: Normal range of motion.      Cervical back: Normal range of motion and neck supple.     Neurological: She is alert and oriented to person, place, and time. She has normal strength. No sensory deficit. GCS score is 15. GCS eye subscore is 4. GCS verbal subscore is 5. GCS motor subscore is 6.   Skin: Skin is warm and dry. Capillary refill takes less than 2 seconds.   Psychiatric: She has a normal mood and affect. Thought content normal.         ED Course   Procedures  Labs Reviewed   COVID/RSV/FLU A&B PCR - Normal       Result Value    Influenza A PCR Not Detected      Influenza B PCR Not Detected      Respiratory Syncytial Virus PCR Not Detected      SARS-CoV-2 PCR Not Detected      Narrative:     The Xpert Xpress SARS-CoV-2/FLU/RSV plus is a rapid, multiplexed real-time PCR test intended for the simultaneous qualitative detection and differentiation of SARS-CoV-2, Influenza A, Influenza B, and respiratory syncytial virus (RSV) viral RNA in either nasopharyngeal swab or nasal swab specimens.         PREGNANCY TEST, URINE RAPID - Normal    hCG Qualitative, Urine Negative            Imaging Results              X-Ray Chest PA And Lateral (Final result)  Result time 11/29/24 17:28:41      Final result by Triny Burks MD (11/29/24 17:28:41)                   Impression:      No acute abnormality of the chest.      Electronically signed by: Triny Burks  Date:    11/29/2024  Time:    17:28               Narrative:    EXAMINATION:  XR CHEST PA AND LATERAL    CLINICAL HISTORY:  Cough, unspecified    TECHNIQUE:  PA and lateral chest radiographs    COMPARISON:  Chest x-ray dated 10/05/2023    FINDINGS:  The heart is normal in size.  The lungs are clear.  There is no pleural effusion or visible pneumothorax                                       Medications   acetaminophen tablet 1,000 mg (1,000 mg Oral Given 11/29/24 7171)     Medical Decision Making  Patient awake,  alert, has non-labored breathing, and follows commands appropriately. Arrived to ED due to cough, fever, and pain with breathing. Symptoms began several days ago. Low grade temperature noted upon arrival. NAD Noted. +sick contact.     Judging by the patient's chief complaint and pertinent history, the patient has the following possible differential diagnoses, including but not limited to the following: COVID, Flu, RSV, Viral Syndrome, Pneumonia     Some of these are deemed to be lower likelihood and some more likely based on my physical exam and history combined with possible lab work and/or imaging studies. Please see the pertinent studies, and refer to the HPI. Some of these diagnoses will take further evaluation to fully rule out, perhaps as an outpatient and the patient was encouraged to follow up when discharged for more comprehensive evaluation.       Amount and/or Complexity of Data Reviewed  Labs: ordered. Decision-making details documented in ED Course.     Details: Swabs negative. Informed patient of results.   Radiology: ordered. Decision-making details documented in ED Course.     Details: Informed patient of results.   Discussion of management or test interpretation with external provider(s): Discussed results with patient. Due to recent exposure to son who is sick with multiple viral agents, symptoms likely cause of exposure. Will treat symptoms. Chest pain endorsed with cough; likely musculoskeletal. At disposition, patient has no additional complaints, VSS, has no respiratory distress, imaging negative, and non-toxic in appearance. Stable for discharge home. Discussed plan of care and interventions with patient. Agreed to and aware of plan of care. Comfortable being discharged home. Patient discharged home. Patient denies new or additional complaints; no further tests indicated at this time. Verbalized understanding of instructions. No emergent or apparent distress noted prior to discharge. Strict ER  return precautions given.       Risk  OTC drugs.  Prescription drug management.               ED Course as of 11/29/24 1942 Fri Nov 29, 2024 1730 SARS-CoV2 (COVID-19) Qualitative PCR: Not Detected [JA]   1730 RSV Ag by Molecular Method: Not Detected [JA]   1730 Influenza B, Molecular: Not Detected [JA]   1730 Influenza A, Molecular: Not Detected [JA]   1731 X-Ray Chest PA And Lateral  The heart is normal in size.  The lungs are clear.  There is no pleural effusion or visible pneumothorax [JA]      ED Course User Index  [JA] Indy Haro NP                           Clinical Impression:  Final diagnoses:  [R05.9] Cough  [B34.9] Viral syndrome (Primary)          ED Disposition Condition    Discharge Stable          ED Prescriptions       Medication Sig Dispense Start Date End Date Auth. Provider    promethazine-codeine 6.25-10 mg/5 ml (PHENERGAN WITH CODEINE) 6.25-10 mg/5 mL syrup  (Status: Discontinued) Take 5 mLs by mouth every 6 (six) hours as needed for Cough. 118 mL 11/29/2024 11/29/2024 Indy Haro NP    ibuprofen (ADVIL,MOTRIN) 600 MG tablet Take 1 tablet (600 mg total) by mouth every 6 (six) hours as needed for Pain. 20 tablet 11/29/2024 -- Indy Haro NP    promethazine-dextromethorphan (PROMETHAZINE-DM) 6.25-15 mg/5 mL Syrp Take 5 mLs by mouth every 6 (six) hours as needed. 118 mL 11/29/2024 -- Indy Haro NP          Follow-up Information       Follow up With Specialties Details Why Contact Info    John Garcia MD Family Medicine Call on 12/2/2024 to schedule an appointment. [address] [facility] [id number] [phone]      [facility] - Emergency Dept Emergency Medicine Go to  If symptoms worsen, As needed [address]  [facility] [id number] [phone]             Indy Haro NP  11/29/24 1753       Indy Haro NP  11/29/24 1943

## 2024-11-29 NOTE — Clinical Note
"Evette Jang (Noelle) was seen and treated in our emergency department on 11/29/2024.  She may return to work on 12/01/2024.       If you have any questions or concerns, please don't hesitate to call.       RN    "

## 2024-12-03 ENCOUNTER — OFFICE VISIT (OUTPATIENT)
Dept: FAMILY MEDICINE | Facility: CLINIC | Age: 24
End: 2024-12-03
Payer: COMMERCIAL

## 2024-12-03 VITALS
BODY MASS INDEX: 31.29 KG/M2 | WEIGHT: 159.38 LBS | RESPIRATION RATE: 20 BRPM | HEIGHT: 60 IN | OXYGEN SATURATION: 97 % | SYSTOLIC BLOOD PRESSURE: 126 MMHG | HEART RATE: 90 BPM | DIASTOLIC BLOOD PRESSURE: 81 MMHG | TEMPERATURE: 99 F

## 2024-12-03 DIAGNOSIS — B00.1 FEVER BLISTER: ICD-10-CM

## 2024-12-03 DIAGNOSIS — F32.A ANXIETY AND DEPRESSION: Primary | ICD-10-CM

## 2024-12-03 DIAGNOSIS — R10.84 GENERALIZED ABDOMINAL PAIN: ICD-10-CM

## 2024-12-03 DIAGNOSIS — K81.9 CHOLECYSTITIS: ICD-10-CM

## 2024-12-03 DIAGNOSIS — F41.9 ANXIETY AND DEPRESSION: Primary | ICD-10-CM

## 2024-12-03 PROCEDURE — 1159F MED LIST DOCD IN RCRD: CPT | Mod: CPTII,,, | Performed by: NURSE PRACTITIONER

## 2024-12-03 PROCEDURE — 1160F RVW MEDS BY RX/DR IN RCRD: CPT | Mod: CPTII,,, | Performed by: NURSE PRACTITIONER

## 2024-12-03 PROCEDURE — 99215 OFFICE O/P EST HI 40 MIN: CPT | Mod: PBBFAC,PN | Performed by: NURSE PRACTITIONER

## 2024-12-03 PROCEDURE — 3074F SYST BP LT 130 MM HG: CPT | Mod: CPTII,,, | Performed by: NURSE PRACTITIONER

## 2024-12-03 PROCEDURE — 3079F DIAST BP 80-89 MM HG: CPT | Mod: CPTII,,, | Performed by: NURSE PRACTITIONER

## 2024-12-03 PROCEDURE — 3008F BODY MASS INDEX DOCD: CPT | Mod: CPTII,,, | Performed by: NURSE PRACTITIONER

## 2024-12-03 PROCEDURE — 99214 OFFICE O/P EST MOD 30 MIN: CPT | Mod: S$PBB,,, | Performed by: NURSE PRACTITIONER

## 2024-12-03 RX ORDER — VALACYCLOVIR HYDROCHLORIDE 1 G/1
2000 TABLET, FILM COATED ORAL EVERY 12 HOURS
Qty: 4 TABLET | Refills: 0 | Status: SHIPPED | OUTPATIENT
Start: 2024-12-03 | End: 2025-12-03

## 2024-12-03 NOTE — ASSESSMENT & PLAN NOTE
She admits that she is having trouble remembering taking her Zoloft daily, but it is helpful.  We discussed ways that she can be mindful such as setting an alarm or putting her medication by her toothbrush for daily administration.  She denies any suicidal or homicidal ideations.  She would like a referral to behavioral health for further evaluation and potential ADHD assessment.

## 2024-12-03 NOTE — ASSESSMENT & PLAN NOTE
This is intermittent.  Unrelated to food intake.  She denies nausea or vomiting.  States has occasional loose stools.  No abdominal pain on exam today.  States this has been off and on for about 3-4 months now and generalized abdominal pain.  We will start the workup with labs and an ultrasound, ER precautions advised.    Abdominal ultrasound shows gallstones and sludge without evidence of cholecystitis.  We will refer to Gastroenterology and General surgery for further advice.  Advised of dietary precautions and ER precautions.  She needs to get lab work done so we can further investigate liver enzymes etc..

## 2024-12-03 NOTE — PROGRESS NOTES
[facility]  Yvan Almeida DNP     Patient ID: [id number]     Chief Complaint: Follow-up (One month f/u appointment. States didn't complete lab work.)      HPI:     Evette Jang is a 24 y.o. female here today for one-month follow-up.  She did not have any lab work or abdominal ultrasound done as order to further investigate intermittent abdominal pain.  Did not have labs or diagnostic workup done prior to today's appointment as ordered.    Health Maintenance         Date Due Completion Date    Lipid Panel Never done ---    Pap Smear Never done ---    TETANUS VACCINE 2023    Hepatitis C Screening 2030 (Originally 2000) ---    HIV Screening 2030 (Originally 2017) ---    Chlamydia Screening 2025    RSV Vaccine (Age 60+ and Pregnant patients) (1 - 1-dose 75+ series) 2077 ---            Past Medical History:   Diagnosis Date    ADHD (attention deficit hyperactivity disorder)     Anxiety     Depression         Past Surgical History:   Procedure Laterality Date     SECTION          Social History     Tobacco Use    Smoking status: Never    Smokeless tobacco: Never   Substance and Sexual Activity    Alcohol use: Yes     Comment: occasional    Drug use: Not Currently     Types: Marijuana    Sexual activity: Yes     Partners: Male        Current Outpatient Medications   Medication Instructions    ibuprofen (ADVIL,MOTRIN) 600 mg, Oral, Every 6 hours PRN    levonorgestreL (MIRENA) 52 mg    naproxen (NAPROSYN) 500 mg, Oral, 2 times daily with meals    promethazine-dextromethorphan (PROMETHAZINE-DM) 6.25-15 mg/5 mL Syrp 5 mLs, Oral, Every 6 hours PRN    sertraline (ZOLOFT) 100 mg, Oral, Daily    triamcinolone acetonide 0.1% (KENALOG) 0.1 % cream Topical (Top), 2 times daily    valACYclovir (VALTREX) 500 mg, Daily    valACYclovir (VALTREX) 2,000 mg, Oral, Every 12 hours       Review of patient's allergies indicates:  No Known Allergies     Patient  Care Team:  John Garcia MD as PCP - General (Family Medicine)     Subjective:     Review of Systems   Constitutional:  Negative for appetite change, chills, diaphoresis and fever.   HENT:  Negative for ear pain, sinus pain and sore throat.    Eyes:  Negative for pain and visual disturbance.   Respiratory:  Negative for cough, shortness of breath and wheezing.    Cardiovascular:  Negative for chest pain, palpitations and leg swelling.   Gastrointestinal:  Negative for abdominal pain, blood in stool, diarrhea, nausea and vomiting.   Endocrine: Negative for cold intolerance.   Genitourinary:  Negative for difficulty urinating, dysuria, frequency and hematuria.   Musculoskeletal:  Negative for arthralgias, joint swelling and myalgias.   Skin:  Negative for color change and rash.   Allergic/Immunologic: Negative.    Neurological: Negative.  Negative for dizziness, syncope, light-headedness and numbness.   Hematological: Negative.    Psychiatric/Behavioral: Negative.  Negative for dysphoric mood and suicidal ideas. The patient is not nervous/anxious.    All other systems reviewed and are negative.      12 point review of systems conducted, negative except as stated in the history of present illness. See HPI for details.    Objective:     Visit Vitals  /81 (BP Location: Right arm, Patient Position: Sitting)   Pulse 90   Temp 98.5 °F (36.9 °C) (Oral)   Resp 20   Ht 5' (1.524 m)   Wt 72.3 kg (159 lb 6.4 oz)   LMP 11/08/2024   SpO2 97%   BMI 31.13 kg/m²       Physical Exam  Vitals and nursing note reviewed.   Constitutional:       General: She is not in acute distress.     Appearance: She is not ill-appearing.   HENT:      Head: Normocephalic and atraumatic.      Mouth/Throat:      Mouth: Mucous membranes are moist.      Pharynx: Oropharynx is clear.   Eyes:      General: No scleral icterus.     Extraocular Movements: Extraocular movements intact.      Conjunctiva/sclera: Conjunctivae normal.      Pupils: Pupils  "are equal, round, and reactive to light.   Neck:      Vascular: No carotid bruit.   Cardiovascular:      Rate and Rhythm: Normal rate and regular rhythm.      Heart sounds: No murmur heard.     No friction rub. No gallop.   Pulmonary:      Effort: Pulmonary effort is normal. No respiratory distress.      Breath sounds: Normal breath sounds. No wheezing, rhonchi or rales.   Abdominal:      General: Abdomen is flat. Bowel sounds are normal. There is no distension.      Palpations: Abdomen is soft. There is no mass.      Tenderness: There is no abdominal tenderness.   Musculoskeletal:         General: Normal range of motion.      Cervical back: Normal range of motion and neck supple.   Skin:     General: Skin is warm and dry.   Neurological:      General: No focal deficit present.      Mental Status: She is alert.   Psychiatric:         Mood and Affect: Mood normal.         Labs Reviewed:     Chemistry:  Lab Results   Component Value Date     04/30/2023    K 3.1 (L) 04/30/2023    BUN 13.1 04/30/2023    CREATININE 1.03 (H) 04/30/2023    EGFRNORACEVR >60 04/30/2023    GLUCOSE 105 (H) 04/30/2023    CALCIUM 9.5 04/30/2023    ALKPHOS 98 04/30/2023    LABPROT 7.9 04/30/2023    ALBUMIN 4.1 04/30/2023    AST 17 04/30/2023    ALT 16 04/30/2023        No results found for: "HGBA1C", "MICROALBCREA"     Hematology:  Lab Results   Component Value Date    WBC 13.2 (H) 04/30/2023    HGB 12.8 04/30/2023    HCT 40.2 04/30/2023     (H) 04/30/2023       Lipid Panel:  No results found for: "CHOL", "HDL", "LDL", "TRIG", "TOTALCHOLEST"     Urine:  Lab Results   Component Value Date    APPEARANCEUA SL CLOUDY (A) 05/01/2024    SGUA 1.020 05/01/2024    PROTEINUA Negative 05/01/2024    KETONESUA Negative 05/01/2024    LEUKOCYTESUR Negative 05/01/2024    RBCUA None Seen 08/16/2023    WBCUA >100 (A) 08/16/2023    BACTERIA Few (A) 08/16/2023        Assessment:       ICD-10-CM ICD-9-CM   1. Anxiety and depression  F41.9 300.00    F32.A " 311   2. Generalized abdominal pain  R10.84 789.07   3. Fever blister  B00.1 054.9   4. Cholecystitis  K81.9 575.10        Plan:     1. Anxiety and depression  Assessment & Plan:  She admits that she is having trouble remembering taking her Zoloft daily, but it is helpful.  We discussed ways that she can be mindful such as setting an alarm or putting her medication by her toothbrush for daily administration.  She denies any suicidal or homicidal ideations.  She would like a referral to behavioral health for further evaluation and potential ADHD assessment.      2. Generalized abdominal pain  Assessment & Plan:  This is intermittent.  Unrelated to food intake.  She denies nausea or vomiting.  States has occasional loose stools.  No abdominal pain on exam today.  States this has been off and on for about 3-4 months now and generalized abdominal pain.  We will start the workup with labs and an ultrasound, ER precautions advised.    Abdominal ultrasound shows gallstones and sludge without evidence of cholecystitis.  We will refer to Gastroenterology and General surgery for further advice.  Advised of dietary precautions and ER precautions.  She needs to get lab work done so we can further investigate liver enzymes etc..      3. Fever blister  -     valACYclovir (VALTREX) 1000 MG tablet; Take 2 tablets (2,000 mg total) by mouth every 12 (twelve) hours.  Dispense: 4 tablet; Refill: 0    4. Cholecystitis  -     Ambulatory referral/consult to General Surgery; Future; Expected date: 12/10/2024  -     Ambulatory referral/consult to Gastroenterology; Future; Expected date: 12/10/2024         Follow up in about 2 months (around 2/3/2025) for follow up, with lab work prior to visit. In addition to their scheduled follow up, the patient has also been instructed to follow up on as needed basis.     No future appointments.     KENNY Hatch

## 2024-12-04 ENCOUNTER — TELEPHONE (OUTPATIENT)
Dept: INTERNAL MEDICINE | Facility: CLINIC | Age: 24
End: 2024-12-04
Payer: COMMERCIAL

## 2024-12-04 NOTE — TELEPHONE ENCOUNTER
----- Message from Milad sent at 12/2/2024  3:44 PM CST -----  .Type:  Needs Medical Advice    Who Called: Evette  Symptoms (please be specific):    How long has patient had these symptoms:    Pharmacy name and phone #:    Would the patient rather a call back or a response via [facility]?   Best Call Back Number: [phone]  Additional Information: Patient requested to have the nurse call her back re: her blood work she has questions. Please call her back when available.

## 2024-12-06 ENCOUNTER — HOSPITAL ENCOUNTER (EMERGENCY)
Facility: HOSPITAL | Age: 24
Discharge: HOME | End: 2024-12-06
Attending: EMERGENCY MEDICINE
Payer: COMMERCIAL

## 2024-12-06 VITALS
HEART RATE: 100 BPM | RESPIRATION RATE: 20 BRPM | HEIGHT: 60 IN | SYSTOLIC BLOOD PRESSURE: 130 MMHG | TEMPERATURE: 98 F | OXYGEN SATURATION: 96 % | WEIGHT: 159 LBS | BODY MASS INDEX: 31.22 KG/M2 | DIASTOLIC BLOOD PRESSURE: 84 MMHG

## 2024-12-06 DIAGNOSIS — J06.9 UPPER RESPIRATORY TRACT INFECTION, UNSPECIFIED TYPE: Primary | ICD-10-CM

## 2024-12-06 PROCEDURE — 63600175 PHARM REV CODE 636 W HCPCS: Performed by: EMERGENCY MEDICINE

## 2024-12-06 PROCEDURE — 96372 THER/PROPH/DIAG INJ SC/IM: CPT | Performed by: EMERGENCY MEDICINE

## 2024-12-06 PROCEDURE — 99284 EMERGENCY DEPT VISIT MOD MDM: CPT | Mod: 25

## 2024-12-06 RX ORDER — PREDNISONE 20 MG/1
60 TABLET ORAL DAILY
Qty: 15 TABLET | Refills: 0 | Status: SHIPPED | OUTPATIENT
Start: 2024-12-06 | End: 2024-12-11

## 2024-12-06 RX ORDER — DEXAMETHASONE SODIUM PHOSPHATE 4 MG/ML
8 INJECTION, SOLUTION INTRA-ARTICULAR; INTRALESIONAL; INTRAMUSCULAR; INTRAVENOUS; SOFT TISSUE
Status: COMPLETED | OUTPATIENT
Start: 2024-12-06 | End: 2024-12-06

## 2024-12-06 RX ORDER — BENZONATATE 200 MG/1
200 CAPSULE ORAL 3 TIMES DAILY PRN
Qty: 30 CAPSULE | Refills: 0 | Status: SHIPPED | OUTPATIENT
Start: 2024-12-06 | End: 2024-12-16

## 2024-12-06 RX ADMIN — DEXAMETHASONE SODIUM PHOSPHATE 8 MG: 4 INJECTION, SOLUTION INTRA-ARTICULAR; INTRALESIONAL; INTRAMUSCULAR; INTRAVENOUS; SOFT TISSUE at 06:12

## 2024-12-23 ENCOUNTER — HOSPITAL ENCOUNTER (EMERGENCY)
Facility: HOSPITAL | Age: 24
Discharge: HOME | End: 2024-12-23
Attending: EMERGENCY MEDICINE
Payer: COMMERCIAL

## 2024-12-23 VITALS
HEART RATE: 85 BPM | BODY MASS INDEX: 31.22 KG/M2 | TEMPERATURE: 98 F | WEIGHT: 159 LBS | SYSTOLIC BLOOD PRESSURE: 117 MMHG | OXYGEN SATURATION: 98 % | DIASTOLIC BLOOD PRESSURE: 79 MMHG | HEIGHT: 60 IN | RESPIRATION RATE: 18 BRPM

## 2024-12-23 DIAGNOSIS — H66.92 LEFT OTITIS MEDIA, UNSPECIFIED OTITIS MEDIA TYPE: Primary | ICD-10-CM

## 2024-12-23 PROCEDURE — 99283 EMERGENCY DEPT VISIT LOW MDM: CPT

## 2024-12-23 RX ORDER — AMOXICILLIN 875 MG/1
875 TABLET, FILM COATED ORAL 2 TIMES DAILY
Qty: 12 TABLET | Refills: 0 | Status: SHIPPED | OUTPATIENT
Start: 2024-12-23 | End: 2024-12-29

## 2024-12-24 NOTE — ED PROVIDER NOTES
Encounter Date: 2024       History     Chief Complaint   Patient presents with    Otalgia     See MDM    The history is provided by the patient. No  was used.     Review of patient's allergies indicates:  No Known Allergies  Past Medical History:   Diagnosis Date    ADHD (attention deficit hyperactivity disorder)     Anxiety     Depression      Past Surgical History:   Procedure Laterality Date     SECTION       Family History   Problem Relation Name Age of Onset    Depression Mother      Anxiety disorder Mother      No Known Problems Father      No Known Problems Sister       Social History     Tobacco Use    Smoking status: Never    Smokeless tobacco: Never   Substance Use Topics    Alcohol use: Yes     Comment: occasional    Drug use: Not Currently     Types: Marijuana     Review of Systems   HENT:  Positive for ear pain.    All other systems reviewed and are negative.      Physical Exam     Initial Vitals [24]   BP Pulse Resp Temp SpO2   117/79 85 18 98.3 °F (36.8 °C) 98 %      MAP       --         Physical Exam    Nursing note and vitals reviewed.  Constitutional: She appears well-developed and well-nourished.   HENT:   Head: Normocephalic and atraumatic.   Right Ear: Tympanic membrane, external ear and ear canal normal.   Left Ear: There is tenderness. No drainage or swelling. No mastoid tenderness. Tympanic membrane is erythematous. Tympanic membrane is not perforated, not retracted and not bulging.   Ears:    Marked erythema of the TM from the 5 o'clock position to the 12 o'clock position.  No canal erythema or edema.  Some mild tragus TTP and pain during otoscopic exam.  No mastoiditis.  Mild cerumen in bilateral ears.   Eyes: EOM are normal.   Neck:   Normal range of motion.  Cardiovascular:  Normal rate and regular rhythm.           Pulmonary/Chest: Breath sounds normal.   Musculoskeletal:         General: Normal range of motion.      Cervical back: Normal  "range of motion.     Neurological: She is alert and oriented to person, place, and time.   Skin: Skin is warm and dry.   Psychiatric: She has a normal mood and affect.         ED Course   Procedures  Labs Reviewed - No data to display       Imaging Results    None          Medications - No data to display  Medical Decision Making  The patient is a 24 y.o. female with a pertinent PMHX of nothing who presents to the Emergency Department with no one with a chief complaint of left ear pain. Symptoms began this morning and have been constant since onset. The your pain is currently rated as a 5/10 in severity and described as "pressure-like, feels like I am under water". Associated symptoms include "I and just getting over a upper respiratory infection". Symptoms are aggravated with touching her ear, clenching her jaw and alleviated with leaning to her left side. The patient denies fever, otorrhea, ear itching, trauma to the ear, recent airplane travel, swimming/ear to immersion in water. They report taking "some ear drops that my friend gave me that started with an O" prior to arrival with no relief of symptoms, and potential worsening. No other reported symptoms at this time.    Pertinent physical exam findings include left TM with marked erythema, mild tragus TTP, and pain during exam.  Vital signs WNL.     Will prescribe antibiotics for ear infection.  Patient verbalized understanding and agreement of plan.  Discharge instructions and return precautions provided.  All questions answered.      Risk  Prescription drug management.      Additional MDM:   Differential Diagnosis:   Other: The following diagnoses were also considered and will be evaluated: Otitis media, Bacterial Otitis externa and Fungal otitis externa.                                   Clinical Impression:  Final diagnoses:  [H66.92] Left otitis media, unspecified otitis media type (Primary)          ED Disposition Condition    Discharge Stable          ED " Prescriptions       Medication Sig Dispense Start Date End Date Auth. Provider    amoxicillin (AMOXIL) 875 MG tablet Take 1 tablet (875 mg total) by mouth 2 (two) times daily. for 6 days 12 tablet 12/23/2024 12/29/2024 Yue Valenzuela PA-C          Follow-up Information       Follow up With Specialties Details Why Contact Info    Primary Care Provider  Call in 1 week As needed Please call [phone] to schedule an appointment with a Primary Care Provider if you do not already have one             Yue Valenzuela PA-C  12/23/24 [id number]

## 2024-12-24 NOTE — DISCHARGE INSTRUCTIONS
Take antibiotics as directed.  Take Motrin or Tylenol as needed for pain.  Follow up with your primary care provider as needed.  If you experience any new or worsening symptoms return to the emergency department.

## 2025-01-09 ENCOUNTER — TELEPHONE (OUTPATIENT)
Dept: INTERNAL MEDICINE | Facility: CLINIC | Age: 25
End: 2025-01-09
Payer: COMMERCIAL

## 2025-01-09 ENCOUNTER — TELEPHONE (OUTPATIENT)
Dept: FAMILY MEDICINE | Facility: CLINIC | Age: 25
End: 2025-01-09
Payer: COMMERCIAL

## 2025-01-09 NOTE — TELEPHONE ENCOUNTER
Called and spoke with patient, returning phone call. Verified . States Dx with fatty liver and needing a gallbladder removal. Patient isn't sure what exactly the letter needs to say. States will contact her employer and notify clinic with information.

## 2025-01-09 NOTE — TELEPHONE ENCOUNTER
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 Pt is est care with [facility] in the future but was a patient of [facility]

## 2025-01-09 NOTE — TELEPHONE ENCOUNTER
----- Message from Lexie sent at 1/9/2025  9:46 AM CST -----  Regarding: med advice  Who Called: Evette Veillon    Caller is requesting assistance/information from provider's office.    Symptoms (please be specific):    How long has patient had these symptoms:    List of preferred pharmacies on file (remove unneeded): [unfilled]  If different, enter pharmacy into here including location and phone number:       Preferred Method of Contact: Phone Call  Patient's Preferred Phone Number on File: [phone]   Best Call Back Number, if different:  Additional Information: pt is requesting a call back, would like to get an excuse for work detailing foods she isnt allowed to eat

## 2025-01-10 ENCOUNTER — TELEPHONE (OUTPATIENT)
Dept: FAMILY MEDICINE | Facility: CLINIC | Age: 25
End: 2025-01-10
Payer: COMMERCIAL

## 2025-01-10 NOTE — TELEPHONE ENCOUNTER
----- Message from Carmela sent at 1/10/2025 10:25 AM CST -----  Who Called: Evette Veillon    Caller is requesting assistance/information from provider's office.    Symptoms (please be specific): n/a   How long has patient had these symptoms:  n/a  List of preferred pharmacies on file (remove unneeded): [unfilled]  If different, enter pharmacy into here including location and phone number: n/a      Preferred Method of Contact: Phone Call  Patient's Preferred Phone Number on File: [phone]   Best Call Back Number, if different:  Additional Information: medical advice, pt called to discuss getting a work excuse to let job know that she can eat only certain foods in her diet on the job ( former [employer]) , please advise, thanks

## 2025-01-10 NOTE — TELEPHONE ENCOUNTER
"Received phone call back from patient. Verified . States is able to receive meals from her work ([employer]) however, needs a letter stating she has food restrictions. Regarding breakfast items she would need her options to be limited to "oatmeal, scrambled eggs, plain biscuits" Requesting due to being Dx with fatty liver and also needing a gallbladder removal.   "

## 2025-01-28 ENCOUNTER — TELEPHONE (OUTPATIENT)
Dept: FAMILY MEDICINE | Facility: CLINIC | Age: 25
End: 2025-01-28
Payer: COMMERCIAL

## 2025-01-28 ENCOUNTER — LAB VISIT (OUTPATIENT)
Dept: LAB | Facility: HOSPITAL | Age: 25
End: 2025-01-28
Attending: NURSE PRACTITIONER
Payer: COMMERCIAL

## 2025-01-28 DIAGNOSIS — R10.84 GENERALIZED ABDOMINAL PAIN: ICD-10-CM

## 2025-01-28 DIAGNOSIS — Z11.3 SCREEN FOR STD (SEXUALLY TRANSMITTED DISEASE): ICD-10-CM

## 2025-01-28 LAB
ALBUMIN SERPL-MCNC: 4.2 G/DL (ref 3.5–5)
ALBUMIN/GLOB SERPL: 1 RATIO (ref 1.1–2)
ALP SERPL-CCNC: 97 UNIT/L (ref 40–150)
ALT SERPL-CCNC: 13 UNIT/L (ref 0–55)
AMYLASE SERPL-CCNC: 47 UNIT/L (ref 25–125)
ANION GAP SERPL CALC-SCNC: 8 MEQ/L
AST SERPL-CCNC: 16 UNIT/L (ref 5–34)
B-HCG UR QL: NEGATIVE
BASOPHILS # BLD AUTO: 0.04 X10(3)/MCL
BASOPHILS NFR BLD AUTO: 0.4 %
BILIRUB SERPL-MCNC: 0.3 MG/DL
BUN SERPL-MCNC: 17.1 MG/DL (ref 7–18.7)
CALCIUM SERPL-MCNC: 9.3 MG/DL (ref 8.4–10.2)
CHLORIDE SERPL-SCNC: 102 MMOL/L (ref 98–107)
CHOLEST SERPL-MCNC: 187 MG/DL
CHOLEST/HDLC SERPL: 4 {RATIO} (ref 0–5)
CO2 SERPL-SCNC: 26 MMOL/L (ref 22–29)
CREAT SERPL-MCNC: 0.84 MG/DL (ref 0.55–1.02)
CREAT/UREA NIT SERPL: 20
CRP SERPL-MCNC: 1.5 MG/L
EOSINOPHIL # BLD AUTO: 0.17 X10(3)/MCL (ref 0–0.9)
EOSINOPHIL NFR BLD AUTO: 1.7 %
ERYTHROCYTE [DISTWIDTH] IN BLOOD BY AUTOMATED COUNT: 12.2 % (ref 11.5–17)
ERYTHROCYTE [SEDIMENTATION RATE] IN BLOOD: 21 MM/HR (ref 0–15)
EST. AVERAGE GLUCOSE BLD GHB EST-MCNC: 102.5 MG/DL
GFR SERPLBLD CREATININE-BSD FMLA CKD-EPI: >60 ML/MIN/1.73/M2
GLOBULIN SER-MCNC: 4.4 GM/DL (ref 2.4–3.5)
GLUCOSE SERPL-MCNC: 101 MG/DL (ref 74–100)
HBA1C MFR BLD: 5.2 %
HCT VFR BLD AUTO: 40.5 % (ref 37–47)
HCV AB SERPL QL IA: ABNORMAL
HDLC SERPL-MCNC: 48 MG/DL (ref 35–60)
HGB BLD-MCNC: 13.4 G/DL (ref 12–16)
HIV 1+2 AB+HIV1 P24 AG SERPL QL IA: NONREACTIVE
IMM GRANULOCYTES # BLD AUTO: 0.02 X10(3)/MCL (ref 0–0.04)
IMM GRANULOCYTES NFR BLD AUTO: 0.2 %
LDLC SERPL CALC-MCNC: 109 MG/DL (ref 50–140)
LIPASE SERPL-CCNC: 32 U/L
LYMPHOCYTES # BLD AUTO: 3.13 X10(3)/MCL (ref 0.6–4.6)
LYMPHOCYTES NFR BLD AUTO: 31.8 %
MCH RBC QN AUTO: 29.6 PG (ref 27–31)
MCHC RBC AUTO-ENTMCNC: 33.1 G/DL (ref 33–36)
MCV RBC AUTO: 89.4 FL (ref 80–94)
MONOCYTES # BLD AUTO: 0.79 X10(3)/MCL (ref 0.1–1.3)
MONOCYTES NFR BLD AUTO: 8 %
NEUTROPHILS # BLD AUTO: 5.69 X10(3)/MCL (ref 2.1–9.2)
NEUTROPHILS NFR BLD AUTO: 57.9 %
NRBC BLD AUTO-RTO: 0 %
PLATELET # BLD AUTO: 431 X10(3)/MCL (ref 130–400)
PMV BLD AUTO: 9.6 FL (ref 7.4–10.4)
POTASSIUM SERPL-SCNC: 3.8 MMOL/L (ref 3.5–5.1)
PROT SERPL-MCNC: 8.6 GM/DL (ref 6.4–8.3)
RBC # BLD AUTO: 4.53 X10(6)/MCL (ref 4.2–5.4)
SODIUM SERPL-SCNC: 136 MMOL/L (ref 136–145)
T4 FREE SERPL-MCNC: 0.8 NG/DL (ref 0.7–1.48)
TRIGL SERPL-MCNC: 149 MG/DL (ref 37–140)
TSH SERPL-ACNC: 2.37 UIU/ML (ref 0.35–4.94)
VLDLC SERPL CALC-MCNC: 30 MG/DL
WBC # BLD AUTO: 9.84 X10(3)/MCL (ref 4.5–11.5)

## 2025-01-28 PROCEDURE — 81025 URINE PREGNANCY TEST: CPT

## 2025-01-28 PROCEDURE — 86803 HEPATITIS C AB TEST: CPT

## 2025-01-28 PROCEDURE — 85025 COMPLETE CBC W/AUTO DIFF WBC: CPT

## 2025-01-28 PROCEDURE — 87389 HIV-1 AG W/HIV-1&-2 AB AG IA: CPT

## 2025-01-28 PROCEDURE — 84439 ASSAY OF FREE THYROXINE: CPT

## 2025-01-28 PROCEDURE — 83036 HEMOGLOBIN GLYCOSYLATED A1C: CPT

## 2025-01-28 PROCEDURE — 85652 RBC SED RATE AUTOMATED: CPT

## 2025-01-28 PROCEDURE — 82150 ASSAY OF AMYLASE: CPT

## 2025-01-28 PROCEDURE — 83690 ASSAY OF LIPASE: CPT

## 2025-01-28 PROCEDURE — 84443 ASSAY THYROID STIM HORMONE: CPT

## 2025-01-28 PROCEDURE — 80053 COMPREHEN METABOLIC PANEL: CPT

## 2025-01-28 PROCEDURE — 86140 C-REACTIVE PROTEIN: CPT

## 2025-01-28 PROCEDURE — 36415 COLL VENOUS BLD VENIPUNCTURE: CPT

## 2025-01-28 PROCEDURE — 80061 LIPID PANEL: CPT

## 2025-01-28 NOTE — TELEPHONE ENCOUNTER
----- Message from Nurse Bravo sent at 1/27/2025  1:05 PM CST -----    ----- Message -----  From: Wilmer Arellano  Sent: 1/27/2025  11:16 AM CST  To: Adonis BRIZUELA Staff    .Who Called: Evette Veillon    Caller is requesting assistance/information from provider's office.    Symptoms (please be specific): pt wants to know where she can get her labs done at: does she come to the office or go to the lab. Please call the pt to benjamin. Called the back line no answer    How long has patient had these symptoms:    List of preferred pharmacies on file (remove unneeded): [unfilled]  If different, enter pharmacy into here including location and phone number:       Preferred Method of Contact: Phone Call  Patient's Preferred Phone Number on File: [phone]   Best Call Back Number, if different:  Additional Information:

## 2025-01-28 NOTE — TELEPHONE ENCOUNTER
"Called and spoke with patient, returning phone call. Verified . Patient asking where she can go to complete lab work. Patient directed to collect lab work at [facility] lab due to one lab ordered on 10/29/24 being a "lab collect" specimen. Verbalized understanding.   "

## 2025-01-29 ENCOUNTER — TELEPHONE (OUTPATIENT)
Dept: FAMILY MEDICINE | Facility: CLINIC | Age: 25
End: 2025-01-29
Payer: COMMERCIAL

## 2025-01-29 DIAGNOSIS — R76.8 POSITIVE HEPATITIS C ANTIBODY TEST: Primary | ICD-10-CM

## 2025-01-29 NOTE — TELEPHONE ENCOUNTER
"----- Message from KENNY Vallecillo sent at 1/29/2025 11:08 AM CST -----  Please advise the patient that while her lab work will be reviewed with her at her upcoming OV next week, one of her test came back positive. Her hepatitis C antibody test came back in what we call the "grey zone" which means it is unsure it if is positive or negative. She will need to come back in and draw another lab test to help confirm if this test is positive of negative.   Thanks,    KENNY Vallecillo    "

## 2025-01-29 NOTE — TELEPHONE ENCOUNTER
"Called and spoke with patient. Verified . Results and instructions given. Verbalized understanding. States she may not be able to get lab work done prior to upcoming appointment. States she almost "passed out" completing recent lab work.  Patient made aware if she cannot, it will be completed at her upcoming office visit. Verbalized understanding. Patient to call clinic with any questions or concerns.   "

## 2025-01-29 NOTE — PROGRESS NOTES
"Please advise the patient that while her lab work will be reviewed with her at her upcoming OV next week, one of her test came back positive. Her hepatitis C antibody test came back in what we call the "grey zone" which means it is unsure it if is positive or negative. She will need to come back in and draw another lab test to help confirm if this test is positive of negative.   Thanks,    KENNY Vallecillo  "

## 2025-02-03 ENCOUNTER — OFFICE VISIT (OUTPATIENT)
Dept: FAMILY MEDICINE | Facility: CLINIC | Age: 25
End: 2025-02-03
Payer: COMMERCIAL

## 2025-02-03 VITALS
HEIGHT: 60 IN | TEMPERATURE: 98 F | RESPIRATION RATE: 18 BRPM | OXYGEN SATURATION: 99 % | BODY MASS INDEX: 30.63 KG/M2 | SYSTOLIC BLOOD PRESSURE: 112 MMHG | HEART RATE: 71 BPM | WEIGHT: 156 LBS | DIASTOLIC BLOOD PRESSURE: 77 MMHG

## 2025-02-03 DIAGNOSIS — R76.8 HEPATITIS C ANTIBODY POSITIVE IN BLOOD: ICD-10-CM

## 2025-02-03 DIAGNOSIS — F32.A ANXIETY AND DEPRESSION: Primary | ICD-10-CM

## 2025-02-03 DIAGNOSIS — F41.9 ANXIETY AND DEPRESSION: Primary | ICD-10-CM

## 2025-02-03 DIAGNOSIS — R10.84 GENERALIZED ABDOMINAL PAIN: ICD-10-CM

## 2025-02-03 PROBLEM — Z11.3 SCREEN FOR STD (SEXUALLY TRANSMITTED DISEASE): Status: RESOLVED | Noted: 2024-10-29 | Resolved: 2025-02-03

## 2025-02-03 PROCEDURE — 1159F MED LIST DOCD IN RCRD: CPT | Mod: CPTII,,, | Performed by: NURSE PRACTITIONER

## 2025-02-03 PROCEDURE — 3074F SYST BP LT 130 MM HG: CPT | Mod: CPTII,,, | Performed by: NURSE PRACTITIONER

## 2025-02-03 PROCEDURE — 3078F DIAST BP <80 MM HG: CPT | Mod: CPTII,,, | Performed by: NURSE PRACTITIONER

## 2025-02-03 PROCEDURE — 99214 OFFICE O/P EST MOD 30 MIN: CPT | Mod: PBBFAC,PN | Performed by: NURSE PRACTITIONER

## 2025-02-03 PROCEDURE — 3008F BODY MASS INDEX DOCD: CPT | Mod: CPTII,,, | Performed by: NURSE PRACTITIONER

## 2025-02-03 PROCEDURE — 99214 OFFICE O/P EST MOD 30 MIN: CPT | Mod: S$PBB,,, | Performed by: NURSE PRACTITIONER

## 2025-02-03 PROCEDURE — 1160F RVW MEDS BY RX/DR IN RCRD: CPT | Mod: CPTII,,, | Performed by: NURSE PRACTITIONER

## 2025-02-03 PROCEDURE — 3044F HG A1C LEVEL LT 7.0%: CPT | Mod: CPTII,,, | Performed by: NURSE PRACTITIONER

## 2025-02-03 RX ORDER — ALBUTEROL SULFATE 90 UG/1
2 INHALANT RESPIRATORY (INHALATION) EVERY 4 HOURS PRN
COMMUNITY
Start: 2024-12-12

## 2025-02-03 RX ORDER — FLUTICASONE PROPIONATE 50 MCG
1 SPRAY, SUSPENSION (ML) NASAL DAILY
COMMUNITY
Start: 2024-12-31

## 2025-02-03 RX ORDER — SERTRALINE HYDROCHLORIDE 100 MG/1
100 TABLET, FILM COATED ORAL DAILY
Qty: 90 TABLET | Refills: 1 | Status: SHIPPED | OUTPATIENT
Start: 2025-02-03 | End: 2026-02-03

## 2025-02-03 NOTE — LETTER
February 3, 2025      [facility]  [address] [address]  Phone: [phone]  Fax: [phone]       Patient: Eevtte Jang   YOB: 2000  Date of Visit: 02/03/2025    To Whom It May Concern:    Eduardo Jang  was at [facility] on 02/03/2025. The patient may return to work/school on 2/3/25 with restrictions. Patient requires an altered menu for dietary considerations to include biscuits, scrambled eggs and oatmeal only.  Patient is not allowed to consume other items from menu for health purposes.      If you have any questions or concerns, or if I can be of further assistance, please do not hesitate to contact me.    Sincerely,    KENNY Mas

## 2025-02-03 NOTE — ASSESSMENT & PLAN NOTE
Zoloft 100 mg po daily not helping but she needs refills.  She declined increase or change of medication today.  Referred to psych in past but no appointment noted.     She admits that she is having trouble remembering taking her Zoloft daily, but it is helpful.  We discussed ways that she can be mindful such as setting an alarm or putting her medication by her toothbrush for daily administration.  She denies any suicidal or homicidal ideations.  She would like a referral to behavioral health for further evaluation and potential ADHD assessment.

## 2025-02-03 NOTE — ASSESSMENT & PLAN NOTE
Pending appointments with GI and Surgery upcoming.    This is intermittent.  Unrelated to food intake.  She denies nausea or vomiting.  States has occasional loose stools.  No abdominal pain on exam today.  States this has been off and on for about 3-4 months now and generalized abdominal pain.  We will start the workup with labs and an ultrasound, ER precautions advised.    Abdominal ultrasound shows gallstones and sludge without evidence of cholecystitis.  We will refer to Gastroenterology and General surgery for further advice.  Advised of dietary precautions and ER precautions.  She needs to get lab work done so we can further investigate liver enzymes etc..

## 2025-02-03 NOTE — PROGRESS NOTES
Patient Name: Evette Jang     : 2000    MRN: [id number]     Subjective:     Patient ID: Evette Jang is a 24 y.o. female.    Chief Complaint:   Chief Complaint   Patient presents with    Establish Care     Pt is here to establish care with PCP.         HPI: 2/3/25: Establish care with PCP.  Previously seen by FELICITAS Almeida NP.  Pt was supposed to get repeat bloodwork with HCV RNA to see if hep c antibody was actually positive since resulted in grey zone, pt did not have this done. She has anxiety on Zoloft 100 mg po daily that is not helping. She prefers to see BH instead of increasing meds. She had referral placed but has not had appointment scheduled.            ROS:      Review of Systems   Psychiatric/Behavioral:  The patient is nervous/anxious.    All other systems reviewed and are negative.        History:     Past Medical History:   Diagnosis Date    ADHD (attention deficit hyperactivity disorder)     Anxiety     Depression     Screen for STD (sexually transmitted disease) 10/29/2024        Past Surgical History:   Procedure Laterality Date     SECTION         Family History   Problem Relation Name Age of Onset    Depression Mother      Anxiety disorder Mother      No Known Problems Father      No Known Problems Sister          Social History     Tobacco Use    Smoking status: Never    Smokeless tobacco: Never   Substance and Sexual Activity    Alcohol use: Yes     Comment: occasional    Drug use: Not Currently     Types: Marijuana    Sexual activity: Yes     Partners: Male       Current Outpatient Medications   Medication Instructions    albuterol (PROVENTIL/VENTOLIN HFA) 90 mcg/actuation inhaler 2 puffs, Every 4 hours PRN    fluticasone propionate (FLONASE) 50 mcg/actuation nasal spray 1 spray, Daily    ibuprofen (ADVIL,MOTRIN) 600 mg, Oral, Every 6 hours PRN    levonorgestreL (MIRENA) 52 mg    sertraline (ZOLOFT) 100 mg, Oral, Daily    valACYclovir (VALTREX) 500 mg, Daily        Review of  patient's allergies indicates:  No Known Allergies    Objective:     Visit Vitals  /77 (BP Location: Left arm, Patient Position: Sitting)   Pulse 71   Temp 98.3 °F (36.8 °C) (Oral)   Resp 18   Ht 5' (1.524 m)   Wt 70.8 kg (156 lb)   LMP 01/10/2025   SpO2 99%   BMI 30.47 kg/m²       Physical Examination:     Physical Exam  Vitals and nursing note reviewed.   Constitutional:       General: She is awake.      Appearance: Normal appearance. She is well-developed and well-groomed.   HENT:      Head: Normocephalic and atraumatic.      Right Ear: Hearing, tympanic membrane, ear canal and external ear normal.      Left Ear: Hearing, tympanic membrane, ear canal and external ear normal.      Nose: Nose normal.      Mouth/Throat:      Lips: Pink.      Mouth: Mucous membranes are moist.      Tongue: No lesions.      Pharynx: Oropharynx is clear. No posterior oropharyngeal erythema.   Eyes:      General: Lids are normal. Vision grossly intact.      Extraocular Movements: Extraocular movements intact.      Conjunctiva/sclera: Conjunctivae normal.      Pupils: Pupils are equal, round, and reactive to light.   Neck:      Thyroid: No thyroid mass.      Vascular: No carotid bruit.      Trachea: Trachea and phonation normal.   Cardiovascular:      Rate and Rhythm: Normal rate and regular rhythm.      Pulses: Normal pulses.      Heart sounds: Normal heart sounds, S1 normal and S2 normal.   Pulmonary:      Effort: Pulmonary effort is normal.      Breath sounds: Normal breath sounds. No decreased breath sounds, wheezing, rhonchi or rales.   Abdominal:      General: Abdomen is flat. Bowel sounds are normal.      Palpations: Abdomen is soft.      Tenderness: There is no abdominal tenderness.   Musculoskeletal:         General: Normal range of motion.      Cervical back: Full passive range of motion without pain and normal range of motion.      Right lower leg: No edema.      Left lower leg: No edema.   Lymphadenopathy:       Cervical: No cervical adenopathy.   Skin:     General: Skin is warm and dry.      Capillary Refill: Capillary refill takes less than 2 seconds.   Neurological:      General: No focal deficit present.      Mental Status: She is alert and oriented to person, place, and time.      GCS: GCS eye subscore is 4. GCS verbal subscore is 5. GCS motor subscore is 6.      Cranial Nerves: Cranial nerves 2-12 are intact.      Sensory: Sensation is intact.      Motor: Motor function is intact.      Coordination: Coordination is intact.      Gait: Gait is intact.   Psychiatric:         Attention and Perception: Attention and perception normal.         Mood and Affect: Mood normal.         Speech: Speech normal.         Behavior: Behavior normal. Behavior is cooperative.         Thought Content: Thought content normal.         Cognition and Memory: Cognition and memory normal.         Lab Results:     Chemistry:  Lab Results   Component Value Date     01/28/2025    K 3.8 01/28/2025    BUN 17.1 01/28/2025    CREATININE 0.84 01/28/2025    EGFRNORACEVR >60 01/28/2025    GLUCOSE 101 (H) 01/28/2025    CALCIUM 9.3 01/28/2025    ALKPHOS 97 01/28/2025    LABPROT 8.6 (H) 01/28/2025    ALBUMIN 4.2 01/28/2025    AST 16 01/28/2025    ALT 13 01/28/2025    TSH 2.371 01/28/2025    QCOJWX7QCST 0.80 01/28/2025        Lab Results   Component Value Date    HGBA1C 5.2 01/28/2025        Hematology:  Lab Results   Component Value Date    WBC 9.84 01/28/2025    HGB 13.4 01/28/2025    HCT 40.5 01/28/2025     (H) 01/28/2025       Lipid Panel:  Lab Results   Component Value Date    CHOL 187 01/28/2025    HDL 48 01/28/2025    .00 01/28/2025    TRIG 149 (H) 01/28/2025    TOTALCHOLEST 4 01/28/2025        Urine:  Lab Results   Component Value Date    APPEARANCEUA SL CLOUDY (A) 05/01/2024    SGUA 1.020 05/01/2024    PROTEINUA Negative 05/01/2024    KETONESUA Negative 05/01/2024    LEUKOCYTESUR Negative 05/01/2024    RBCUA None Seen 08/16/2023     WBCUA >100 (A) 08/16/2023    BACTERIA Few (A) 08/16/2023        Assessment:          ICD-10-CM ICD-9-CM   1. Anxiety and depression  F41.9 300.00    F32.A 311   2. Hepatitis C antibody positive in blood  R76.8 795.79   3. Generalized abdominal pain  R10.84 789.07          Plan:     1. Anxiety and depression  Assessment & Plan:  Zoloft 100 mg po daily not helping but she needs refills.  She declined increase or change of medication today.  Referred to psych in past but no appointment noted.     She admits that she is having trouble remembering taking her Zoloft daily, but it is helpful.  We discussed ways that she can be mindful such as setting an alarm or putting her medication by her toothbrush for daily administration.  She denies any suicidal or homicidal ideations.  She would like a referral to behavioral health for further evaluation and potential ADHD assessment.    Orders:  -     Ambulatory referral/consult to Behavioral Health; Future; Expected date: 02/10/2025  -     sertraline (ZOLOFT) 100 MG tablet; Take 1 tablet (100 mg total) by mouth once daily.  Dispense: 90 tablet; Refill: 1    2. Hepatitis C antibody positive in blood  -     Hepatitis C Viral(HCV) RNA, Quant Real-Time PCR w/Reflexs; Future; Expected date: 02/03/2025    3. Generalized abdominal pain  Assessment & Plan:  Pending appointments with GI and Surgery upcoming.    This is intermittent.  Unrelated to food intake.  She denies nausea or vomiting.  States has occasional loose stools.  No abdominal pain on exam today.  States this has been off and on for about 3-4 months now and generalized abdominal pain.  We will start the workup with labs and an ultrasound, ER precautions advised.    Abdominal ultrasound shows gallstones and sludge without evidence of cholecystitis.  We will refer to Gastroenterology and General surgery for further advice.  Advised of dietary precautions and ER precautions.  She needs to get lab work done so we can further  investigate liver enzymes etc..             Follow up in about 6 months (around 8/3/2025) for anxiety, herpes 1..  In addition to their scheduled follow up, the patient has also been instructed to follow up on as needed basis.       Future Appointments   Date Time Provider Department Center   2/4/2025 11:00 AM SURGERY CLINIC, [facility] GENERAL SURGERY [facility] GENSUR [place]    8/4/2025  8:00 AM Briana Lamb FNP [facility]   10/29/2025  9:00 AM Migdalia Dozier FNP [facility] GASTRO [place]         KENNY Mas

## 2025-02-04 ENCOUNTER — OFFICE VISIT (OUTPATIENT)
Dept: SURGERY | Facility: CLINIC | Age: 25
End: 2025-02-04
Payer: COMMERCIAL

## 2025-02-04 VITALS
TEMPERATURE: 98 F | HEART RATE: 71 BPM | SYSTOLIC BLOOD PRESSURE: 121 MMHG | HEIGHT: 60 IN | WEIGHT: 155 LBS | BODY MASS INDEX: 30.43 KG/M2 | RESPIRATION RATE: 19 BRPM | DIASTOLIC BLOOD PRESSURE: 83 MMHG | OXYGEN SATURATION: 100 %

## 2025-02-04 DIAGNOSIS — K81.9 CHOLECYSTITIS: ICD-10-CM

## 2025-02-04 PROCEDURE — 99215 OFFICE O/P EST HI 40 MIN: CPT | Mod: PBBFAC

## 2025-02-04 RX ORDER — HEPARIN SODIUM 5000 [USP'U]/ML
5000 INJECTION, SOLUTION INTRAVENOUS; SUBCUTANEOUS EVERY 8 HOURS
OUTPATIENT
Start: 2025-02-04

## 2025-02-04 RX ORDER — SODIUM CHLORIDE 9 MG/ML
INJECTION, SOLUTION INTRAVENOUS CONTINUOUS
OUTPATIENT
Start: 2025-02-04

## 2025-02-04 RX ORDER — CEFAZOLIN SODIUM 2 G/50ML
2 SOLUTION INTRAVENOUS
OUTPATIENT
Start: 2025-02-04

## 2025-02-04 NOTE — PROGRESS NOTES
Patient seen by Dr. Melgar. Patient instructed to RTC in 2 weeks for post op after surgery on March 7th. Consent will have to be resigned due to the surgery being a month out. Instructions given.

## 2025-02-05 ENCOUNTER — HOSPITAL ENCOUNTER (EMERGENCY)
Facility: HOSPITAL | Age: 25
Discharge: HOME | End: 2025-02-05
Attending: EMERGENCY MEDICINE
Payer: COMMERCIAL

## 2025-02-05 VITALS
DIASTOLIC BLOOD PRESSURE: 74 MMHG | BODY MASS INDEX: 30.63 KG/M2 | OXYGEN SATURATION: 100 % | TEMPERATURE: 98 F | WEIGHT: 156 LBS | SYSTOLIC BLOOD PRESSURE: 108 MMHG | RESPIRATION RATE: 20 BRPM | HEIGHT: 60 IN | HEART RATE: 69 BPM

## 2025-02-05 DIAGNOSIS — R21 RASH: Primary | ICD-10-CM

## 2025-02-05 DIAGNOSIS — L50.9 URTICARIAL RASH: ICD-10-CM

## 2025-02-05 DIAGNOSIS — L30.9 DERMATITIS: ICD-10-CM

## 2025-02-05 LAB
B-HCG UR QL: NEGATIVE
FLUAV AG UPPER RESP QL IA.RAPID: NOT DETECTED
FLUBV AG UPPER RESP QL IA.RAPID: NOT DETECTED
RSV A 5' UTR RNA NPH QL NAA+PROBE: NOT DETECTED
SARS-COV-2 RNA RESP QL NAA+PROBE: NOT DETECTED
STREP A PCR (OHS): NOT DETECTED

## 2025-02-05 PROCEDURE — 81025 URINE PREGNANCY TEST: CPT | Performed by: PHYSICIAN ASSISTANT

## 2025-02-05 PROCEDURE — 87651 STREP A DNA AMP PROBE: CPT | Performed by: PHYSICIAN ASSISTANT

## 2025-02-05 PROCEDURE — 0241U COVID/RSV/FLU A&B PCR: CPT | Performed by: PHYSICIAN ASSISTANT

## 2025-02-05 PROCEDURE — 99284 EMERGENCY DEPT VISIT MOD MDM: CPT

## 2025-02-05 RX ORDER — CETIRIZINE HYDROCHLORIDE 10 MG/1
10 TABLET ORAL DAILY
Qty: 30 TABLET | Refills: 0 | Status: SHIPPED | OUTPATIENT
Start: 2025-02-05 | End: 2026-02-05

## 2025-02-05 RX ORDER — PREDNISONE 20 MG/1
30 TABLET ORAL DAILY
Qty: 8 TABLET | Refills: 0 | Status: SHIPPED | OUTPATIENT
Start: 2025-02-05 | End: 2025-02-10

## 2025-02-05 RX ORDER — TRIAMCINOLONE ACETONIDE 1 MG/G
CREAM TOPICAL 2 TIMES DAILY
Qty: 15 G | Refills: 0 | Status: SHIPPED | OUTPATIENT
Start: 2025-02-05 | End: 2025-02-10

## 2025-02-05 RX ORDER — DEXAMETHASONE SODIUM PHOSPHATE 4 MG/ML
8 INJECTION, SOLUTION INTRA-ARTICULAR; INTRALESIONAL; INTRAMUSCULAR; INTRAVENOUS; SOFT TISSUE
Status: DISCONTINUED | OUTPATIENT
Start: 2025-02-05 | End: 2025-02-05

## 2025-02-05 RX ORDER — FAMOTIDINE 20 MG/1
20 TABLET, FILM COATED ORAL
Status: DISCONTINUED | OUTPATIENT
Start: 2025-02-05 | End: 2025-02-05

## 2025-02-05 RX ORDER — FAMOTIDINE 40 MG/1
40 TABLET, FILM COATED ORAL DAILY
Qty: 30 TABLET | Refills: 11 | Status: SHIPPED | OUTPATIENT
Start: 2025-02-05 | End: 2026-02-05

## 2025-02-05 NOTE — ED PROVIDER NOTES
Encounter Date: 2025       History     Chief Complaint   Patient presents with    Urticaria     Hives onset Monday, pt states small difuse hives on arms chest and stomach. Pt denies history of rash or hives. Pt took benedryl yesterday and allegra this am at approx 1030am with minimal relief noted.      See MDM for details.      The history is provided by the patient. No  was used.     Review of patient's allergies indicates:  No Known Allergies  Past Medical History:   Diagnosis Date    ADHD (attention deficit hyperactivity disorder)     Anxiety     Depression     Screen for STD (sexually transmitted disease) 10/29/2024     Past Surgical History:   Procedure Laterality Date     SECTION       Family History   Problem Relation Name Age of Onset    Depression Mother      Anxiety disorder Mother      No Known Problems Father      No Known Problems Sister       Social History     Tobacco Use    Smoking status: Never    Smokeless tobacco: Never   Substance Use Topics    Alcohol use: Yes     Comment: occasional    Drug use: Not Currently     Types: Marijuana     Review of Systems   Constitutional: Negative.    HENT:  Positive for sore throat.    Eyes: Negative.    Respiratory: Negative.     Cardiovascular: Negative.    Gastrointestinal: Negative.    Genitourinary: Negative.    Musculoskeletal: Negative.    Skin:  Positive for rash.   Neurological: Negative.        Physical Exam     Initial Vitals [25 1455]   BP Pulse Resp Temp SpO2   108/74 69 20 98.4 °F (36.9 °C) 100 %      MAP       --         Physical Exam    Nursing note and vitals reviewed.  Constitutional: She appears well-developed and well-nourished.   HENT:   Right Ear: Tympanic membrane and external ear normal.   Left Ear: Tympanic membrane and external ear normal.   Nose: No mucosal edema or rhinorrhea. Mouth/Throat: Mucous membranes are normal. Posterior oropharyngeal erythema present. No oropharyngeal exudate or posterior  oropharyngeal edema.   Eyes: EOM are normal. Pupils are equal, round, and reactive to light. Right eye exhibits no discharge. Left eye exhibits no discharge.   Neck: Neck supple.   Normal range of motion.  Cardiovascular:  Normal rate and regular rhythm.           Pulmonary/Chest: Breath sounds normal. No respiratory distress. She has no wheezes. She has no rhonchi. She has no rales.   Abdominal: Abdomen is soft. Bowel sounds are normal. There is no abdominal tenderness.   Musculoskeletal:      Cervical back: Normal range of motion and neck supple.     Lymphadenopathy:        Head (right side): No submental and no tonsillar adenopathy present.        Head (left side): No submental and no tonsillar adenopathy present.     She has no cervical adenopathy.        Right cervical: No superficial cervical adenopathy present.       Left cervical: No superficial cervical adenopathy present.     She has no axillary adenopathy.   Skin: Rash noted.   Reticular raised pink rash to bilateral upper extremities anterior and posterior trunk as well.  No sandpaper texture noted.    Psychiatric: She has a normal mood and affect. Her speech is normal and behavior is normal. Judgment and thought content normal. Cognition and memory are normal.         ED Course   Procedures  Labs Reviewed   PREGNANCY TEST, URINE RAPID - Normal       Result Value    hCG Qualitative, Urine Negative     COVID/RSV/FLU A&B PCR - Normal    Influenza A PCR Not Detected      Influenza B PCR Not Detected      Respiratory Syncytial Virus PCR Not Detected      SARS-CoV-2 PCR Not Detected      Narrative:     The Xpert Xpress SARS-CoV-2/FLU/RSV plus is a rapid, multiplexed real-time PCR test intended for the simultaneous qualitative detection and differentiation of SARS-CoV-2, Influenza A, Influenza B, and respiratory syncytial virus (RSV) viral RNA in either nasopharyngeal swab or nasal swab specimens.         STREP GROUP A BY PCR - Normal    STREP A PCR ([facility]) Not  Detected      Narrative:     The Xpert Xpress Strep A test is a rapid, qualitative in vitro diagnostic test for the detection of Streptococcus pyogenes (Group A ß-hemolytic Streptococcus, Strep A) in throat swab specimens from patients with signs and symptoms of pharyngitis.            Imaging Results    None          Medications - No data to display  Medical Decision Making  24yoWF w/no PMH presents to the emergency room with itching rash to the arms chest and back for the last 3-4 days.  Denies any new soaps, lotions, perfumes, irritants, foods, or exposure to any new chemicals.  Patient's son does have some sort of respiratory infection.  Patient states she also has a sore throat.  Denies nausea, vomiting, fever, chills, chest pain, or difficulty breathing.  Patient states she took some Benadryl without relief.    Problems Addressed:  Rash: acute illness or injury     Details: Differential diagnosis included but not limited to:  Viral infection, strep, flu, COVID, RSV, allergic reaction    Likely viral infection based on the reticular rash.  It could also be an allergic reaction.  Patient states she is not sure that she was exposed to anything new or out of the ordinary.  She will continue to track the rash.  Told her in the rash could be present for 1-2 weeks.  Should it worsen or she has any throat swelling or any other concern she is to return to the emergency room.  Patient will follow up with primary care.  Patient declined medication in the emergency department. All questions asked and answered at the time of the visit. Strict ER precautions given. Patient and family members agreeable to plan.       Amount and/or Complexity of Data Reviewed  Labs: ordered. Decision-making details documented in ED Course.    Risk  OTC drugs.  Prescription drug management.               ED Course as of 02/05/25 1623   Wed Feb 05, 2025   1543 Patient declined steroid injection and Pepcid secondary to GI upset.  Offered oral  steroids and she declined that as well. [ST]   1549 hCG Qualitative, Urine: Negative [ST]   1622 STREP A PCR ([facility]): Not Detected [ST]   1622 Influenza A, Molecular: Not Detected [ST]   1622 Influenza B, Molecular: Not Detected [ST]   1622 RSV Ag by Molecular Method: Not Detected [ST]   1622 SARS-CoV2 (COVID-19) Qualitative PCR: Not Detected [ST]      ED Course User Index  [ST] Traci Boateng PA                             Clinical Impression:  Final diagnoses:  [R21] Rash (Primary)  [L30.9] Dermatitis  [L50.9] Urticarial rash          ED Disposition Condition    Discharge Stable          ED Prescriptions       Medication Sig Dispense Start Date End Date Auth. Provider    famotidine (PEPCID) 40 MG tablet Take 1 tablet (40 mg total) by mouth once daily. 30 tablet 2/5/2025 2/5/2026 Traci Boateng PA    predniSONE (DELTASONE) 20 MG tablet Take 1.5 tablets (30 mg total) by mouth once daily. Take with full meal and glass of water. for 5 days 8 tablet 2/5/2025 2/10/2025 Traci Boateng PA    triamcinolone acetonide 0.1% (KENALOG) 0.1 % cream Apply topically 2 (two) times daily. for 5 days 15 g 2/5/2025 2/10/2025 Traci Boateng PA    cetirizine (ZYRTEC) 10 MG tablet Take 1 tablet (10 mg total) by mouth once daily. 30 tablet 2/5/2025 2/5/2026 Traci Boateng PA          Follow-up Information       Follow up With Specialties Details Why Contact Info    Briana Lamb, FNP Family Medicine Schedule an appointment as soon as possible for a visit today  [address] [facility] [address] [phone]      [facility] - Emergency Dept Emergency Medicine  As needed, If symptoms worsen [id number]  [facility] [address] [phone]        This note was typed partially using voice recognition software.  Please be reminded that not all corrections/addendums to grammar may have been made prior to closing of this chart.       Traci Boateng,  PA  02/05/25 [id number]

## 2025-02-05 NOTE — Clinical Note
"Evette Arauzparker Jang was seen and treated in our emergency department on 2/5/2025.  She may return to work on 02/10/2025.  Please excuse for up to 48hours prior for symptoms present at that time if possible. Patient may also return sooner than above date if symptoms improve/resolve.      If you have any questions or concerns, please don't hesitate to call.      Traci Boateng PA"

## 2025-02-05 NOTE — PROGRESS NOTES
[facility] General Surgery Clinic Note    HPI:     Ms. Jang is a 24-year-old female who presents to clinic today for surgical evaluation of cholecystitis. She first started experiencing these symptoms in Aug-2024, and her PCP ordered an US abdomen which revealed small gallstones v. Sludge.    Today, she reports RUQ and L sided abdominal pain that worsens after eating [employer] (where she works). She reports the intermittent pain is an 8/10 and is associated with nausea. She states the pain moderately resolves after having a bowel movement and avoiding trigger foods, but otherwise, she hasn't tried any medications to help with the pain. She reports having a bowel movement every morning and endorses an episode of diarrhea once a week that is sometimes associated with the pain episodes. She denies vomiting, fever, chills, and recent weight change. All other ROS as below.    PSHx of  only    PMH:   Past Medical History:   Diagnosis Date    ADHD (attention deficit hyperactivity disorder)     Anxiety     Depression     Screen for STD (sexually transmitted disease) 10/29/2024      Meds:   Current Outpatient Medications:     albuterol (PROVENTIL/VENTOLIN HFA) 90 mcg/actuation inhaler, Inhale 2 puffs into the lungs every 4 (four) hours as needed., Disp: , Rfl:     fluticasone propionate (FLONASE) 50 mcg/actuation nasal spray, 1 spray by Each Nostril route Daily., Disp: , Rfl:     ibuprofen (ADVIL,MOTRIN) 600 MG tablet, Take 1 tablet (600 mg total) by mouth every 6 (six) hours as needed for Pain., Disp: 20 tablet, Rfl: 0    levonorgestreL (MIRENA) 21 mcg/24 hours (8 yrs) 52 mg IUD, 52 mg by Intrauterine route., Disp: , Rfl:     sertraline (ZOLOFT) 100 MG tablet, Take 1 tablet (100 mg total) by mouth once daily., Disp: 90 tablet, Rfl: 1    valACYclovir (VALTREX) 500 MG tablet, Take 500 mg by mouth once daily., Disp: , Rfl:   Allergies: Review of patient's allergies indicates:  No Known Allergies  Social History:    Social History     Tobacco Use    Smoking status: Never    Smokeless tobacco: Never   Substance Use Topics    Alcohol use: Yes     Comment: occasional    Drug use: Not Currently     Types: Marijuana     Family History:   Family History   Problem Relation Name Age of Onset    Depression Mother      Anxiety disorder Mother      No Known Problems Father      No Known Problems Sister       Surgical History:   Past Surgical History:   Procedure Laterality Date     SECTION       Review of Systems:  General: Denies fever, chills, weight loss, fatigue, and lymph node swelling.  Skin: No rashes or itching.  Head: Denies headache and vision changes.  Respiratory and Cardiac: Denies shortness of breath, chest pain, and swelling in hands/feet.  Gastrointestinal: Denies nausea, vomiting, and changes in bowel habits.  Urinary: Denies dysuria or hematuria.    Objective:    Vitals:  Vitals:    25 1135   BP: 121/83   Pulse: 71   Resp: 19   Temp: 98.1 °F (36.7 °C)        Physical Exam:  Gen: NAD  Neuro: Awake, alert, answering questions appropriately  CV: RRR  Resp: NWOB on RA, CTAB  Abd: Soft, ND, NT, negative Hameed's sign  : Deferred  Ext: Moves all 4 spontaneously and purposefully  Skin: Warm, well perfused    Pertinent Labs:  CBC (25)  WBC: 9.84  Hgb: 13.4  Hct: 40.5  Plt: 431    CMP (25)  Na: 136  K: 3.8  Cl: 102  CO2: 26  BUN: 17.1  Cr: 0.84  Glucose: 101    HbA1c (25)   5.2    Lipid Panel (25)  Cholesterol: 187  HDL: 48  Triglycerides: 149 (H)  LDL: 109    Amylase (25)  47    Lipase (25)  32    CRP (25)  1.50    Imaging:  US Abdomen (24)  Impression  - Mild hepatic steatosis.  - Small stones versus sludge balls without evidence of cholecystitis.  Gallbladder Findings  - Gallbladder contains small stones or sludge balls. No wall thickening or sonographic Hameed sign. Common bile duct measures 0.3 cm.     Micro/Path/Other:  None    Assessment/Plan:  Ms. Jang is a  "24-year-old female who presents to clinic today with symptomatic cholelithaisis    - Pt consented and scheduled for laparoscopic cholecystectomy on 3/7. Consented however will need it updated on day of surgery due to lasting 30 days  - R/b/a discussed. Patient would like to delay surgery until March. Return precautions given    Beena Angeles, MS3  [facility] DOR[facility]    I have seen and evaluated the patient. The note has been edited as appropriate and I agree with the plan.    Thais Melgar MD (Anya)  [facility] General Surgery PGY3    "

## 2025-02-06 ENCOUNTER — HOSPITAL ENCOUNTER (EMERGENCY)
Facility: HOSPITAL | Age: 25
Discharge: HOME | End: 2025-02-06
Attending: EMERGENCY MEDICINE
Payer: COMMERCIAL

## 2025-02-06 VITALS
OXYGEN SATURATION: 98 % | HEIGHT: 60 IN | BODY MASS INDEX: 30.63 KG/M2 | WEIGHT: 156 LBS | TEMPERATURE: 98 F | DIASTOLIC BLOOD PRESSURE: 78 MMHG | SYSTOLIC BLOOD PRESSURE: 116 MMHG | HEART RATE: 84 BPM | RESPIRATION RATE: 18 BRPM

## 2025-02-06 DIAGNOSIS — R21 RASH AND NONSPECIFIC SKIN ERUPTION: Primary | ICD-10-CM

## 2025-02-06 PROCEDURE — 99283 EMERGENCY DEPT VISIT LOW MDM: CPT

## 2025-02-06 RX ORDER — HYDROXYZINE PAMOATE 25 MG/1
25 CAPSULE ORAL EVERY 8 HOURS PRN
Qty: 15 CAPSULE | Refills: 0 | Status: SHIPPED | OUTPATIENT
Start: 2025-02-06

## 2025-02-06 RX ORDER — DEXAMETHASONE SODIUM PHOSPHATE 4 MG/ML
8 INJECTION, SOLUTION INTRA-ARTICULAR; INTRALESIONAL; INTRAMUSCULAR; INTRAVENOUS; SOFT TISSUE
Status: DISCONTINUED | OUTPATIENT
Start: 2025-02-06 | End: 2025-02-07 | Stop reason: HOSPADM

## 2025-02-06 NOTE — PROGRESS NOTES
I have reviewed the notes, assessments, and/or procedures performed by Dr Melgar, I concur with her/his documentation of Evette Jang.  Date of Service: 2/4/2025    [facility]

## 2025-02-07 NOTE — ED PROVIDER NOTES
Encounter Date: 2025       History     Chief Complaint   Patient presents with    Rash     Pt c/o persistent rash that started Monday. Pt was seen here yesterday for same c/o Rx with steroids, cream, and pepcid.Pt started rx meds last night but didn't start pepcid.       Patient states generalized rash with itching times 3-4 days.  Patient states that she was seen here yesterday in the ED for the same rash and was started on oral steroids, Pepcid, and Zyrtec.  Patient states that she has taken 1 dose of her medications.  Patient states that she still has the rash and itching.  Denies any worsening.  Patient denies any fever, drainage, or crusting.  Patient denies any new foods, medicines, or products.  History of anxiety, depression, ADHD, .    The history is provided by the patient.   Rash   This is a new problem. The current episode started several days ago. The problem has been unchanged. The problem is associated with nothing. Affected Location: Generalized. The pain is at a severity of 0/10. Associated symptoms include itching. Pertinent negatives include no blisters, no pain and no weeping.     Review of patient's allergies indicates:  No Known Allergies  Past Medical History:   Diagnosis Date    ADHD (attention deficit hyperactivity disorder)     Anxiety     Depression     Screen for STD (sexually transmitted disease) 10/29/2024     Past Surgical History:   Procedure Laterality Date     SECTION       Family History   Problem Relation Name Age of Onset    Depression Mother      Anxiety disorder Mother      No Known Problems Father      No Known Problems Sister       Social History     Tobacco Use    Smoking status: Never    Smokeless tobacco: Never   Substance Use Topics    Alcohol use: Yes     Comment: occasional    Drug use: Not Currently     Types: Marijuana     Review of Systems   Constitutional: Negative.  Negative for fever.   HENT: Negative.     Eyes: Negative.    Respiratory:  Negative.     Cardiovascular: Negative.    Gastrointestinal: Negative.    Endocrine: Negative.    Genitourinary: Negative.    Musculoskeletal: Negative.    Skin:  Positive for itching and rash.   Allergic/Immunologic: Negative.    Neurological: Negative.    Hematological: Negative.    Psychiatric/Behavioral: Negative.     All other systems reviewed and are negative.      Physical Exam     Initial Vitals [02/06/25 1957]   BP Pulse Resp Temp SpO2   116/78 84 18 98.2 °F (36.8 °C) 98 %      MAP       --         Physical Exam    Nursing note and vitals reviewed.  Constitutional: She appears well-developed and well-nourished. No distress.   HENT:   Head: Normocephalic and atraumatic. Mouth/Throat: Uvula is midline, oropharynx is clear and moist and mucous membranes are normal.   Eyes: Conjunctivae and EOM are normal. Pupils are equal, round, and reactive to light.   Neck: Neck supple.   Normal range of motion.  Cardiovascular:  Normal rate, regular rhythm, normal heart sounds and intact distal pulses.           Pulmonary/Chest: Breath sounds normal. No respiratory distress. She has no wheezes.   Abdominal: Abdomen is soft. She exhibits no distension. There is no abdominal tenderness.   Musculoskeletal:         General: No tenderness or edema. Normal range of motion.      Cervical back: Normal range of motion and neck supple. No rigidity.     Lymphadenopathy:     She has no cervical adenopathy.   Neurological: She is alert and oriented to person, place, and time. She has normal strength.   Skin: Skin is warm and dry. Rash (There is a mildly erythematous and maculopapular appearing rash to bilateral upper extremities, chest, abdomen, back, and bilateral lower extremities.  Rash is blanchable.  There is no drainage or crusting.) noted.   Psychiatric: She has a normal mood and affect. Thought content normal.         ED Course   Procedures  Labs Reviewed - No data to display       Imaging Results    None          Medications    dexAMETHasone injection 8 mg (has no administration in time range)     Medical Decision Making  Amount and/or Complexity of Data Reviewed  External Data Reviewed: notes.     Details: Reviewed notes from recent ED visit yesterday.  Discussion of management or test interpretation with external provider(s): Differential diagnosis (including but not limited to):   Judging by the patient's chief complaint and pertinent history, the patient has the following possible differential diagnoses, including but not limited to the following.  Some of these are deemed to be lower likelihood and some more likely based on my physical exam and history combined with possible lab work and/or imaging studies.   Please see the pertinent studies, and refer to the HPI.  Some of these diagnoses will take further evaluation to fully rule out, perhaps as an outpatient and the patient was encouraged to follow up when discharged for more comprehensive evaluation.  Rash, pityriasis, viral rash, dermatitis, allergic reaction.  Patient states that she has only taken 1 dose of her oral steroids that she was prescribed yesterday.  Patient is requesting an IM injection of steroids.  We will give patient IM Decadron and send a prescription for Vistaril for itching.  Instructed patient to continue oral steroids as previously prescribed yesterday in other medications.  Instructed patient to follow up with her primary care provider.  ED return precautions were given. .      Risk  Prescription drug management.                                      Clinical Impression:  Final diagnoses:  [R21] Rash and nonspecific skin eruption (Primary)          ED Disposition Condition    Discharge Stable          ED Prescriptions       Medication Sig Dispense Start Date End Date Auth. Provider    hydrOXYzine pamoate (VISTARIL) 25 MG Cap Take 1 capsule (25 mg total) by mouth every 8 (eight) hours as needed (Itching). 15 capsule 2/6/2025 -- Mitzy Hawley FNP           Follow-up Information       Follow up With Specialties Details Why Contact Info    Briana Lamb, SAHILP Family Medicine In 3 days  [facility] [facility] [address] [phone]               Mitzy Hawley, KENNY  02/06/25 7437

## 2025-03-03 ENCOUNTER — TELEPHONE (OUTPATIENT)
Dept: SURGERY | Facility: CLINIC | Age: 25
End: 2025-03-03
Payer: COMMERCIAL

## 2025-03-03 NOTE — TELEPHONE ENCOUNTER
SEBASTIAN nurse called the patient for pre-op and she said she wishes to cancel her surgery.  I phoned the patient and she wants to come back for the surgery in May.  Follow up visit scheduled for May 6.  Instructed the patient to call us if needed in the meantime.

## 2025-03-16 ENCOUNTER — HOSPITAL ENCOUNTER (EMERGENCY)
Facility: HOSPITAL | Age: 25
Discharge: HOME | End: 2025-03-16
Attending: EMERGENCY MEDICINE
Payer: COMMERCIAL

## 2025-03-16 VITALS
HEART RATE: 104 BPM | RESPIRATION RATE: 20 BRPM | OXYGEN SATURATION: 98 % | BODY MASS INDEX: 30.43 KG/M2 | WEIGHT: 155 LBS | SYSTOLIC BLOOD PRESSURE: 116 MMHG | TEMPERATURE: 97 F | HEIGHT: 60 IN | DIASTOLIC BLOOD PRESSURE: 84 MMHG

## 2025-03-16 DIAGNOSIS — B96.89 BACTERIAL VAGINOSIS: ICD-10-CM

## 2025-03-16 DIAGNOSIS — N76.0 BACTERIAL VAGINOSIS: ICD-10-CM

## 2025-03-16 DIAGNOSIS — N89.8 VAGINAL DISCHARGE: Primary | ICD-10-CM

## 2025-03-16 LAB
B-HCG UR QL: NEGATIVE
BILIRUB UR QL STRIP.AUTO: NEGATIVE
CLARITY UR: CLEAR
CLUE CELLS VAG QL WET PREP: ABNORMAL
COLOR UR AUTO: YELLOW
GLUCOSE UR QL STRIP: NEGATIVE
HGB UR QL STRIP: NEGATIVE
KETONES UR QL STRIP: NEGATIVE
LEUKOCYTE ESTERASE UR QL STRIP: NEGATIVE
NITRITE UR QL STRIP: NEGATIVE
PH UR STRIP: 5.5 [PH]
PROT UR QL STRIP: NEGATIVE
SP GR UR STRIP.AUTO: 1.02 (ref 1–1.03)
T VAGINALIS VAG QL WET PREP: ABNORMAL
UROBILINOGEN UR STRIP-ACNC: 0.2
WBC #/AREA VAG WET PREP: ABNORMAL
YEAST SPEC QL WET PREP: ABNORMAL

## 2025-03-16 PROCEDURE — 81025 URINE PREGNANCY TEST: CPT | Performed by: EMERGENCY MEDICINE

## 2025-03-16 PROCEDURE — 81003 URINALYSIS AUTO W/O SCOPE: CPT | Performed by: EMERGENCY MEDICINE

## 2025-03-16 PROCEDURE — 99283 EMERGENCY DEPT VISIT LOW MDM: CPT

## 2025-03-16 PROCEDURE — 87210 SMEAR WET MOUNT SALINE/INK: CPT | Performed by: NURSE PRACTITIONER

## 2025-03-16 PROCEDURE — 87491 CHLMYD TRACH DNA AMP PROBE: CPT | Performed by: EMERGENCY MEDICINE

## 2025-03-16 RX ORDER — METRONIDAZOLE 500 MG/1
500 TABLET ORAL EVERY 12 HOURS
Qty: 14 TABLET | Refills: 0 | Status: SHIPPED | OUTPATIENT
Start: 2025-03-16 | End: 2025-03-23

## 2025-03-17 LAB
C TRACH DNA SPEC QL NAA+PROBE: NOT DETECTED
N GONORRHOEA DNA SPEC QL NAA+PROBE: NOT DETECTED
SOURCE (OHS): NORMAL

## 2025-03-17 NOTE — ED PROVIDER NOTES
Encounter Date: 3/16/2025       History     Chief Complaint   Patient presents with    Vaginal Itching     Pt concerned about yeast infection and possible STD     Patient states white vaginal discharge times 1 week.  Patient denies any dysuria, rash, lesion, sore, pelvic pain, abdominal pain, or fever.  Patient states unprotected intercourse and possible STD exposure.  Past medical history of ADHD, anxiety, depression, .    The history is provided by the patient.     Review of patient's allergies indicates:  No Known Allergies  Past Medical History:   Diagnosis Date    ADHD (attention deficit hyperactivity disorder)     Anxiety     Depression     Screen for STD (sexually transmitted disease) 10/29/2024     Past Surgical History:   Procedure Laterality Date     SECTION       Family History   Problem Relation Name Age of Onset    Depression Mother      Anxiety disorder Mother      No Known Problems Father      No Known Problems Sister       Social History[1]  Review of Systems   Constitutional: Negative.  Negative for fever.   HENT: Negative.     Eyes: Negative.    Respiratory: Negative.     Cardiovascular: Negative.    Gastrointestinal: Negative.  Negative for abdominal pain.   Endocrine: Negative.    Genitourinary:  Positive for vaginal discharge. Negative for genital sores and pelvic pain.   Musculoskeletal: Negative.    Skin: Negative.  Negative for rash.   Allergic/Immunologic: Negative.    Neurological: Negative.    Hematological: Negative.    Psychiatric/Behavioral: Negative.     All other systems reviewed and are negative.      Physical Exam     Initial Vitals [25]   BP Pulse Resp Temp SpO2   116/84 104 20 97 °F (36.1 °C) 98 %      MAP       --         Physical Exam    Nursing note and vitals reviewed.  Constitutional: She appears well-developed and well-nourished. No distress.   HENT:   Head: Normocephalic and atraumatic. Mouth/Throat: Oropharynx is clear and moist.   Eyes:  Conjunctivae and EOM are normal. Pupils are equal, round, and reactive to light.   Neck: Neck supple.   Normal range of motion.  Cardiovascular:  Normal rate, regular rhythm, normal heart sounds and intact distal pulses.           Pulmonary/Chest: Breath sounds normal. No respiratory distress. She has no wheezes.   Abdominal: Abdomen is soft. Bowel sounds are normal. She exhibits no distension. There is no abdominal tenderness.   Musculoskeletal:         General: No tenderness or edema. Normal range of motion.      Cervical back: Normal range of motion and neck supple.     Neurological: She is alert and oriented to person, place, and time. She has normal strength. GCS score is 15. GCS eye subscore is 4. GCS verbal subscore is 5. GCS motor subscore is 6.   Skin: Skin is warm and dry. No rash noted.   Psychiatric: She has a normal mood and affect. Thought content normal.         ED Course   Procedures  Labs Reviewed   WET PREP, GENITAL - Abnormal       Result Value    WBC, Wet Prep Occasional (*)     Clue Cells, Wet Prep None Seen      Trichomonas, Wet Prep None Seen      Yeast, Wet Prep None Seen     CHLAMYDIA/GONORRHOEAE(GC), PCR   URINALYSIS, REFLEX TO URINE CULTURE   PREGNANCY TEST, URINE RAPID          Imaging Results    None          Medications - No data to display  Medical Decision Making  Patient states white vaginal discharge times 1 week.  Patient denies any dysuria, rash, lesion, sore, pelvic pain, abdominal pain, or fever.  Patient states unprotected intercourse and possible STD exposure.  Past medical history of ADHD, anxiety, depression, .    The history is provided by the patient.       Amount and/or Complexity of Data Reviewed  Labs: ordered. Decision-making details documented in ED Course.  Discussion of management or test interpretation with external provider(s): Differential diagnosis (including but not limited to):   Judging by the patient's chief complaint and pertinent history, the  patient has the following possible differential diagnoses, including but not limited to the following.  Some of these are deemed to be lower likelihood and some more likely based on my physical exam and history combined with possible lab work and/or imaging studies.   Please see the pertinent studies, and refer to the HPI.  Some of these diagnoses will take further evaluation to fully rule out, perhaps as an outpatient and the patient was encouraged to follow up when discharged for more comprehensive evaluation.  Gonorrhea, chlamydia, yeast infection, BV, STD, STD exposure  Patient's GC/chlamydia PCR test is pending.  Discussed this with patient.  Patient states that she wants to wait until PCR test results before initiating STD treatment for gonorrhea or chlamydia.  ED return precautions given.                                        Clinical Impression:  Final diagnoses:  [N89.8] Vaginal discharge (Primary)  [N76.0, B96.89] Bacterial vaginosis          ED Disposition Condition    Discharge Stable          ED Prescriptions       Medication Sig Dispense Start Date End Date Auth. Provider    metroNIDAZOLE (FLAGYL) 500 MG tablet Take 1 tablet (500 mg total) by mouth every 12 (twelve) hours. for 7 days 14 tablet 3/16/2025 3/23/2025 Mitzy Hawley FNP          Follow-up Information       Follow up With Specialties Details Why Contact Info    Briana Lamb FNP Family Medicine In 1 week  [address] [place] [address] [phone]      [facility]  In 3 days                 [1]   Social History  Tobacco Use    Smoking status: Never    Smokeless tobacco: Never   Substance Use Topics    Alcohol use: Yes     Comment: occasional    Drug use: Not Currently     Types: Marijuana        Mitzy Hawley FNP  03/16/25 6213

## 2025-03-18 ENCOUNTER — OFFICE VISIT (OUTPATIENT)
Dept: FAMILY MEDICINE | Facility: CLINIC | Age: 25
End: 2025-03-18
Payer: COMMERCIAL

## 2025-03-18 VITALS
HEIGHT: 60 IN | HEART RATE: 78 BPM | TEMPERATURE: 99 F | BODY MASS INDEX: 30.82 KG/M2 | OXYGEN SATURATION: 98 % | WEIGHT: 157 LBS | DIASTOLIC BLOOD PRESSURE: 86 MMHG | RESPIRATION RATE: 18 BRPM | SYSTOLIC BLOOD PRESSURE: 122 MMHG

## 2025-03-18 DIAGNOSIS — M25.541 ARTHRALGIA OF BOTH HANDS: Primary | ICD-10-CM

## 2025-03-18 DIAGNOSIS — Z00.00 ENCOUNTER FOR WELLNESS EXAMINATION: ICD-10-CM

## 2025-03-18 DIAGNOSIS — M25.542 ARTHRALGIA OF BOTH HANDS: Primary | ICD-10-CM

## 2025-03-18 PROCEDURE — 99213 OFFICE O/P EST LOW 20 MIN: CPT | Mod: S$PBB,,, | Performed by: NURSE PRACTITIONER

## 2025-03-18 PROCEDURE — 3079F DIAST BP 80-89 MM HG: CPT | Mod: CPTII,,, | Performed by: NURSE PRACTITIONER

## 2025-03-18 PROCEDURE — 3074F SYST BP LT 130 MM HG: CPT | Mod: CPTII,,, | Performed by: NURSE PRACTITIONER

## 2025-03-18 PROCEDURE — 1159F MED LIST DOCD IN RCRD: CPT | Mod: CPTII,,, | Performed by: NURSE PRACTITIONER

## 2025-03-18 PROCEDURE — 3008F BODY MASS INDEX DOCD: CPT | Mod: CPTII,,, | Performed by: NURSE PRACTITIONER

## 2025-03-18 PROCEDURE — 3044F HG A1C LEVEL LT 7.0%: CPT | Mod: CPTII,,, | Performed by: NURSE PRACTITIONER

## 2025-03-18 PROCEDURE — 1160F RVW MEDS BY RX/DR IN RCRD: CPT | Mod: CPTII,,, | Performed by: NURSE PRACTITIONER

## 2025-03-18 PROCEDURE — 99215 OFFICE O/P EST HI 40 MIN: CPT | Mod: PBBFAC,PN | Performed by: NURSE PRACTITIONER

## 2025-03-18 NOTE — PROGRESS NOTES
Patient Name: Evette Jang     : 2000    MRN: [id number]     Subjective:     Patient ID: Evette Jang is a 24 y.o. female.    Chief Complaint:   Chief Complaint   Patient presents with    Follow-up     Pt is here for follow up. Pt concerned about arthritis but denies any pain        HPI: 3/18/25: Today, pt is concerned about arthritis but denies any pain.  Pt not due to be seen until August.  Scheduled appointment today due to concerns about arthritis. She would like to be worked up for RA.  She is not wanting to do labs today and denies pain. She had one episode of hand pain and feet swelling.  This has not occurred since. Mother with hx arthritis.        2/3/25: Establish care with PCP.  Previously seen by FELICITAS Almeida NP.  Pt was supposed to get repeat bloodwork with HCV RNA to see if hep c antibody was actually positive since resulted in grey zone, pt did not have this done. She has anxiety on Zoloft 100 mg po daily that is not helping. She prefers to see  instead of increasing meds. She had referral placed but has not had appointment scheduled.                 ROS:      Review of Systems   Constitutional: Negative.    HENT: Negative.     Eyes: Negative.    Respiratory: Negative.     Cardiovascular: Negative.    Gastrointestinal: Negative.    Genitourinary: Negative.    Musculoskeletal:  Positive for joint pain.   Skin: Negative.    Neurological: Negative.    Endo/Heme/Allergies: Negative.    Psychiatric/Behavioral: Negative.     All other systems reviewed and are negative.      12 point review of systems conducted, negative except as stated in the history of present illness. See HPI for details.    History:     Past Medical History:   Diagnosis Date    ADHD (attention deficit hyperactivity disorder)     Anxiety     Depression     Screen for STD (sexually transmitted disease) 10/29/2024        Past Surgical History:   Procedure Laterality Date     SECTION         Family History   Problem  Relation Name Age of Onset    Depression Mother      Anxiety disorder Mother      No Known Problems Father      No Known Problems Sister          Social History     Tobacco Use    Smoking status: Never    Smokeless tobacco: Never   Substance and Sexual Activity    Alcohol use: Yes     Comment: occasional    Drug use: Not Currently     Types: Marijuana    Sexual activity: Yes     Partners: Male       Current Outpatient Medications   Medication Instructions    albuterol (PROVENTIL/VENTOLIN HFA) 90 mcg/actuation inhaler 2 puffs, Every 4 hours PRN    cetirizine (ZYRTEC) 10 mg, Oral, Daily    famotidine (PEPCID) 40 mg, Oral, Daily    fluticasone propionate (FLONASE) 50 mcg/actuation nasal spray 1 spray, Daily    hydrOXYzine pamoate (VISTARIL) 25 mg, Oral, Every 8 hours PRN    ibuprofen (ADVIL,MOTRIN) 600 mg, Oral, Every 6 hours PRN    levonorgestreL (MIRENA) 52 mg    metroNIDAZOLE (FLAGYL) 500 mg, Oral, Every 12 hours    sertraline (ZOLOFT) 100 mg, Oral, Daily    triamcinolone acetonide 0.1% (KENALOG) 0.1 % cream Topical (Top), 2 times daily    valACYclovir (VALTREX) 500 mg, Daily        Review of patient's allergies indicates:  No Known Allergies    Objective:     Visit Vitals  /86 (BP Location: Left arm, Patient Position: Sitting)   Pulse 78   Temp 98.9 °F (37.2 °C) (Oral)   Resp 18   Ht 5' (1.524 m)   Wt 71.2 kg (157 lb)   LMP 02/07/2025   SpO2 98%   BMI 30.66 kg/m²       Physical Examination:     Physical Exam  Vitals reviewed.   Constitutional:       Appearance: Normal appearance. She is normal weight.   HENT:      Head: Normocephalic.   Cardiovascular:      Rate and Rhythm: Normal rate and regular rhythm.      Pulses: Normal pulses.      Heart sounds: Normal heart sounds.   Pulmonary:      Effort: Pulmonary effort is normal.      Breath sounds: Normal breath sounds.   Abdominal:      General: Abdomen is flat.      Palpations: Abdomen is soft.   Musculoskeletal:         General: Normal range of motion.       Cervical back: Normal range of motion.   Skin:     General: Skin is warm and dry.   Neurological:      Mental Status: She is alert.   Psychiatric:         Mood and Affect: Mood normal.         Lab Results:     Chemistry:  Lab Results   Component Value Date     01/28/2025    K 3.8 01/28/2025    BUN 17.1 01/28/2025    CREATININE 0.84 01/28/2025    EGFRNORACEVR >60 01/28/2025    GLUCOSE 101 (H) 01/28/2025    CALCIUM 9.3 01/28/2025    ALKPHOS 97 01/28/2025    LABPROT 8.6 (H) 01/28/2025    ALBUMIN 4.2 01/28/2025    AST 16 01/28/2025    ALT 13 01/28/2025    TSH 2.371 01/28/2025    PPZOTD6GEQU 0.80 01/28/2025        Lab Results   Component Value Date    HGBA1C 5.2 01/28/2025        Hematology:  Lab Results   Component Value Date    WBC 9.84 01/28/2025    HGB 13.4 01/28/2025    HCT 40.5 01/28/2025     (H) 01/28/2025       Lipid Panel:  Lab Results   Component Value Date    CHOL 187 01/28/2025    HDL 48 01/28/2025    .00 01/28/2025    TRIG 149 (H) 01/28/2025    TOTALCHOLEST 4 01/28/2025        Urine:  Lab Results   Component Value Date    APPEARANCEUA Clear 03/16/2025    SGUA 1.025 03/16/2025    PROTEINUA Negative 03/16/2025    KETONESUA Negative 03/16/2025    LEUKOCYTESUR Negative 03/16/2025    RBCUA None Seen 08/16/2023    WBCUA >100 (A) 08/16/2023    BACTERIA Few (A) 08/16/2023        Assessment:          ICD-10-CM ICD-9-CM   1. Arthralgia of both hands  M25.541 719.44    M25.542    2. Encounter for wellness examination  Z00.00 V70.0          Plan:     1. Arthralgia of both hands  Assessment & Plan:  RA workup ordered today to be done whenever patient has time.    Orders:  -     RHEUMATOID ARTHRITIS PANEL; Future; Expected date: 03/18/2025  -     Hepatitis C Viral(HCV) RNA, Quant Real-Time PCR w/Reflexs; Future; Expected date: 03/18/2025    2. Encounter for wellness examination  -     Ambulatory referral/consult to Gynecology; Future; Expected date: 03/25/2025           Follow up in about 5 months  (around 8/4/2025)..  In addition to their scheduled follow up, the patient has also been instructed to follow up on as needed basis.       Future Appointments   Date Time Provider Department Center   3/24/2025  1:00 PM [facility]   5/6/2025  8:30 AM SURGERY CLINIC, [facility] GENERAL SURGERY [facility] [place]    8/4/2025  8:00 AM Briana Lamb FNP [facility]   10/29/2025  9:00 AM Migdalia Dozier FNP [facility]        KENNY Mas

## 2025-03-18 NOTE — PATIENT INSTRUCTIONS
Oliver Alas,     If you are due for any health screening(s) below please notify me so we can arrange them to be ordered and scheduled. Most healthy patients at your age complete them, but you are free to accept or refuse.     If you can't do it, I'll definitely understand. If you can, I'd certainly appreciate it!    Tests to Keep You Healthy    Cervical Cancer Screening: DUE      Your cervical cancer screening is due     Our records indicate that you may be overdue for your screening Pap smear. A Pap smear is an important health screening that can detect abnormal cells that can become cervical cancer. Cervical cancer screenings allow for early diagnosis and increase the likelihood of successful treatment.     The current recommendation for Pap smear screening is every 3-5 years for women at average risk. We encourage you to schedule your appointment with your womens health provider. Many women see a gynecologist for this screening, but some primary care providers also provide Pap screening.     If you recently had your Pap smear screening performed outside of [facility], please let your health care team know so that they can update your health record.

## 2025-03-24 ENCOUNTER — HOSPITAL ENCOUNTER (EMERGENCY)
Facility: HOSPITAL | Age: 25
Discharge: HOME | End: 2025-03-24
Attending: STUDENT IN AN ORGANIZED HEALTH CARE EDUCATION/TRAINING PROGRAM
Payer: COMMERCIAL

## 2025-03-24 VITALS
OXYGEN SATURATION: 98 % | SYSTOLIC BLOOD PRESSURE: 126 MMHG | RESPIRATION RATE: 18 BRPM | WEIGHT: 155 LBS | HEART RATE: 111 BPM | HEIGHT: 60 IN | BODY MASS INDEX: 30.43 KG/M2 | TEMPERATURE: 99 F | DIASTOLIC BLOOD PRESSURE: 78 MMHG

## 2025-03-24 DIAGNOSIS — R53.83 FATIGUE, UNSPECIFIED TYPE: Primary | ICD-10-CM

## 2025-03-24 LAB
ALBUMIN SERPL-MCNC: 4.1 G/DL (ref 3.5–5)
ALBUMIN/GLOB SERPL: 1.1 RATIO (ref 1.1–2)
ALP SERPL-CCNC: 80 UNIT/L (ref 40–150)
ALT SERPL-CCNC: 15 UNIT/L (ref 0–55)
ANION GAP SERPL CALC-SCNC: 8 MEQ/L
AST SERPL-CCNC: 21 UNIT/L (ref 11–45)
B-HCG UR QL: NEGATIVE
BASOPHILS # BLD AUTO: 0.02 X10(3)/MCL
BASOPHILS NFR BLD AUTO: 0.2 %
BILIRUB SERPL-MCNC: 0.7 MG/DL
BILIRUB UR QL STRIP.AUTO: NEGATIVE
BUN SERPL-MCNC: 14.4 MG/DL (ref 7–18.7)
CALCIUM SERPL-MCNC: 9 MG/DL (ref 8.4–10.2)
CHLORIDE SERPL-SCNC: 107 MMOL/L (ref 98–107)
CK SERPL-CCNC: 86 U/L (ref 29–168)
CLARITY UR: CLEAR
CO2 SERPL-SCNC: 23 MMOL/L (ref 22–29)
COLOR UR AUTO: YELLOW
CREAT SERPL-MCNC: 0.81 MG/DL (ref 0.55–1.02)
CREAT/UREA NIT SERPL: 18
EOSINOPHIL # BLD AUTO: 0.05 X10(3)/MCL (ref 0–0.9)
EOSINOPHIL NFR BLD AUTO: 0.5 %
ERYTHROCYTE [DISTWIDTH] IN BLOOD BY AUTOMATED COUNT: 13 % (ref 11.5–17)
FLUAV AG UPPER RESP QL IA.RAPID: NOT DETECTED
FLUBV AG UPPER RESP QL IA.RAPID: NOT DETECTED
GFR SERPLBLD CREATININE-BSD FMLA CKD-EPI: >60 ML/MIN/1.73/M2
GLOBULIN SER-MCNC: 3.6 GM/DL (ref 2.4–3.5)
GLUCOSE SERPL-MCNC: 106 MG/DL (ref 74–100)
GLUCOSE UR QL STRIP: NEGATIVE
HCT VFR BLD AUTO: 45.2 % (ref 37–47)
HGB BLD-MCNC: 15 G/DL (ref 12–16)
HGB UR QL STRIP: NEGATIVE
IMM GRANULOCYTES # BLD AUTO: 0.03 X10(3)/MCL (ref 0–0.04)
IMM GRANULOCYTES NFR BLD AUTO: 0.3 %
KETONES UR QL STRIP: NEGATIVE
LEUKOCYTE ESTERASE UR QL STRIP: NEGATIVE
LYMPHOCYTES # BLD AUTO: 0.65 X10(3)/MCL (ref 0.6–4.6)
LYMPHOCYTES NFR BLD AUTO: 6 %
MAGNESIUM SERPL-MCNC: 1.7 MG/DL (ref 1.6–2.6)
MCH RBC QN AUTO: 29.9 PG (ref 27–31)
MCHC RBC AUTO-ENTMCNC: 33.2 G/DL (ref 33–36)
MCV RBC AUTO: 90 FL (ref 80–94)
MONOCYTES # BLD AUTO: 0.47 X10(3)/MCL (ref 0.1–1.3)
MONOCYTES NFR BLD AUTO: 4.3 %
NEUTROPHILS # BLD AUTO: 9.63 X10(3)/MCL (ref 2.1–9.2)
NEUTROPHILS NFR BLD AUTO: 88.7 %
NITRITE UR QL STRIP: NEGATIVE
NRBC BLD AUTO-RTO: 0 %
PH UR STRIP: 6.5 [PH]
PLATELET # BLD AUTO: 330 X10(3)/MCL (ref 130–400)
PMV BLD AUTO: 9.1 FL (ref 7.4–10.4)
POTASSIUM SERPL-SCNC: 4 MMOL/L (ref 3.5–5.1)
PROT SERPL-MCNC: 7.7 GM/DL (ref 6.4–8.3)
PROT UR QL STRIP: NEGATIVE
RBC # BLD AUTO: 5.02 X10(6)/MCL (ref 4.2–5.4)
RSV A 5' UTR RNA NPH QL NAA+PROBE: NOT DETECTED
SARS-COV-2 RNA RESP QL NAA+PROBE: NOT DETECTED
SODIUM SERPL-SCNC: 138 MMOL/L (ref 136–145)
SP GR UR STRIP.AUTO: 1.02 (ref 1–1.03)
TSH SERPL-ACNC: 0.55 UIU/ML (ref 0.35–4.94)
UROBILINOGEN UR STRIP-ACNC: 0.2
WBC # BLD AUTO: 10.85 X10(3)/MCL (ref 4.5–11.5)

## 2025-03-24 PROCEDURE — 63600175 PHARM REV CODE 636 W HCPCS: Performed by: STUDENT IN AN ORGANIZED HEALTH CARE EDUCATION/TRAINING PROGRAM

## 2025-03-24 PROCEDURE — 81025 URINE PREGNANCY TEST: CPT | Performed by: STUDENT IN AN ORGANIZED HEALTH CARE EDUCATION/TRAINING PROGRAM

## 2025-03-24 PROCEDURE — 84443 ASSAY THYROID STIM HORMONE: CPT | Performed by: STUDENT IN AN ORGANIZED HEALTH CARE EDUCATION/TRAINING PROGRAM

## 2025-03-24 PROCEDURE — 96360 HYDRATION IV INFUSION INIT: CPT

## 2025-03-24 PROCEDURE — 85025 COMPLETE CBC W/AUTO DIFF WBC: CPT | Performed by: STUDENT IN AN ORGANIZED HEALTH CARE EDUCATION/TRAINING PROGRAM

## 2025-03-24 PROCEDURE — 99284 EMERGENCY DEPT VISIT MOD MDM: CPT | Mod: 25

## 2025-03-24 PROCEDURE — 0241U COVID/RSV/FLU A&B PCR: CPT | Performed by: STUDENT IN AN ORGANIZED HEALTH CARE EDUCATION/TRAINING PROGRAM

## 2025-03-24 PROCEDURE — 83735 ASSAY OF MAGNESIUM: CPT | Performed by: STUDENT IN AN ORGANIZED HEALTH CARE EDUCATION/TRAINING PROGRAM

## 2025-03-24 PROCEDURE — 80053 COMPREHEN METABOLIC PANEL: CPT | Performed by: STUDENT IN AN ORGANIZED HEALTH CARE EDUCATION/TRAINING PROGRAM

## 2025-03-24 PROCEDURE — 82550 ASSAY OF CK (CPK): CPT | Performed by: STUDENT IN AN ORGANIZED HEALTH CARE EDUCATION/TRAINING PROGRAM

## 2025-03-24 PROCEDURE — 81003 URINALYSIS AUTO W/O SCOPE: CPT | Performed by: STUDENT IN AN ORGANIZED HEALTH CARE EDUCATION/TRAINING PROGRAM

## 2025-03-24 RX ADMIN — SODIUM CHLORIDE, POTASSIUM CHLORIDE, SODIUM LACTATE AND CALCIUM CHLORIDE 1000 ML: 600; 310; 30; 20 INJECTION, SOLUTION INTRAVENOUS at 01:03

## 2025-03-24 NOTE — Clinical Note
"Evette Jang (Noelle) was seen and treated in our emergency department on 3/24/2025.  She may return to work on 03/28/2025.       If you have any questions or concerns, please don't hesitate to call.       RN    "

## 2025-03-24 NOTE — ED PROVIDER NOTES
Encounter Date: 3/24/2025       History     Chief Complaint   Patient presents with    Fatigue     Pt complaint of feeling weak, nauseated and headache this morning     HPI    24-year-old female with a past medical history of anxiety depression presents emergency department for intermittent fatigue, generalized body weakness with intermittent nausea.  She also woke up with a headache this morning.  Patient states that she wants to make sure she is not dehydrated.  Denies any abdominal pain.  No burning on urination.  States that she has been drinking water all day today with no vomiting.  States that she feels like if she would eat she would vomit.  States that it aches improving.  No medications taken prior to arrival.    Review of patient's allergies indicates:  No Known Allergies  Past Medical History:   Diagnosis Date    ADHD (attention deficit hyperactivity disorder)     Anxiety     Depression     Screen for STD (sexually transmitted disease) 10/29/2024     Past Surgical History:   Procedure Laterality Date     SECTION       Family History   Problem Relation Name Age of Onset    Depression Mother      Anxiety disorder Mother      No Known Problems Father      No Known Problems Sister       Social History[1]  Review of Systems   Constitutional:  Positive for fatigue. Negative for fever.   Respiratory:  Negative for cough.    Cardiovascular:  Negative for chest pain.   Gastrointestinal:  Positive for nausea. Negative for abdominal pain, constipation, diarrhea and vomiting.   Neurological:  Positive for headaches.   All other systems reviewed and are negative.      Physical Exam     Initial Vitals [25 1256]   BP Pulse Resp Temp SpO2   106/78 (!) 120 18 99 °F (37.2 °C) 97 %      MAP       --         Physical Exam    Nursing note and vitals reviewed.  Constitutional: She appears well-developed and well-nourished. No distress.   Cardiovascular:  Normal rate and regular rhythm.           Pulmonary/Chest:  Breath sounds normal. No respiratory distress. She has no wheezes. She has no rhonchi. She has no rales.   Abdominal: Abdomen is soft. There is no abdominal tenderness. There is no rebound and no guarding.   Musculoskeletal:         General: No tenderness. Normal range of motion.      Comments: Muscle cramps to the hands bilaterally     Neurological: She is alert and oriented to person, place, and time. She has normal strength.   Skin: Skin is warm. Capillary refill takes less than 2 seconds.   Psychiatric:   Extremely anxious         ED Course   Procedures  Labs Reviewed   COMPREHENSIVE METABOLIC PANEL - Abnormal       Result Value    Sodium 138      Potassium 4.0      Chloride 107      CO2 23      Glucose 106 (*)     Blood Urea Nitrogen 14.4      Creatinine 0.81      Calcium 9.0      Protein Total 7.7      Albumin 4.1      Globulin 3.6 (*)     Albumin/Globulin Ratio 1.1      Bilirubin Total 0.7      ALP 80      ALT 15      AST 21      eGFR >60      Anion Gap 8.0      BUN/Creatinine Ratio 18     CBC WITH DIFFERENTIAL - Abnormal    WBC 10.85      RBC 5.02      Hgb 15.0      Hct 45.2      MCV 90.0      MCH 29.9      MCHC 33.2      RDW 13.0      Platelet 330      MPV 9.1      Neut % 88.7      Lymph % 6.0      Mono % 4.3      Eos % 0.5      Basophil % 0.2      Imm Grans % 0.3      Neut # 9.63 (*)     Lymph # 0.65      Mono # 0.47      Eos # 0.05      Baso # 0.02      Imm Gran # 0.03      NRBC% 0.0     PREGNANCY TEST, URINE RAPID - Normal    hCG Qualitative, Urine Negative     URINALYSIS, REFLEX TO URINE CULTURE - Normal    Color, UA Yellow      Appearance, UA Clear      Specific Gravity, UA 1.020      pH, UA 6.5      Protein, UA Negative      Glucose, UA Negative      Ketones, UA Negative      Blood, UA Negative      Bilirubin, UA Negative      Urobilinogen, UA 0.2      Nitrites, UA Negative      Leukocyte Esterase, UA Negative     TSH - Normal    TSH 0.554     MAGNESIUM - Normal    Magnesium Level 1.70      COVID/RSV/FLU A&B PCR - Normal    Influenza A PCR Not Detected      Influenza B PCR Not Detected      Respiratory Syncytial Virus PCR Not Detected      SARS-CoV-2 PCR Not Detected      Narrative:     The Xpert Xpress SARS-CoV-2/FLU/RSV plus is a rapid, multiplexed real-time PCR test intended for the simultaneous qualitative detection and differentiation of SARS-CoV-2, Influenza A, Influenza B, and respiratory syncytial virus (RSV) viral RNA in either nasopharyngeal swab or nasal swab specimens.         CK - Normal    Creatine Kinase 86     CBC W/ AUTO DIFFERENTIAL    Narrative:     The following orders were created for panel order CBC auto differential.  Procedure                               Abnormality         Status                     ---------                               -----------         ------                     CBC with Differential[[id number]]       Abnormal            Final result                 Please view results for these tests on the individual orders.          Imaging Results    None          Medications   lactated ringers bolus 1,000 mL (0 mLs Intravenous Stopped 3/24/25 6170)     Medical Decision Making  Initial Assessment:       Fatigue      Differential Diagnosis:   Judging by the patient's chief complaint and pertinent history, the patient has the following possible differential diagnoses, including but not limited to the following.  Some of these are deemed to be lower likelihood and some more likely based on my physical exam and history combined with possible lab work and/or imaging studies.   Please see the pertinent studies, and refer to the HPI.  Some of these diagnoses will take further evaluation to fully rule out, perhaps as an outpatient and the patient was encouraged to follow up when discharged for more comprehensive evaluation.      Dehydration, anemia, pregnancy, UTI, rhabdomyolysis, electrolyte abnormality, viral syndrome,  as well as multiple other possible  etiologies      Problems Addressed:  Fatigue, unspecified type: self-limited or minor problem    Amount and/or Complexity of Data Reviewed  Labs: ordered. Decision-making details documented in ED Course.               ED Course as of 03/24/25 1507   Mon Mar 24, 2025   1333 WBC: 10.85 [BS]   1333 Hemoglobin: 15.0 [BS]   1333 Hematocrit: 45.2 [BS]   1333 Platelet Count: 330 [BS]   1334 Patient has a severe phobia of needles.  After having IV stick she started having cramping of her hands.  Will add a CK.  IVF infusing.  Cold rag given [BS]   1457 hCG Qualitative, Urine: Negative [BS]   1504 Leukocyte Esterase, UA: Negative [BS]   1504 NITRITE UA: Negative [BS]   1507 Labs all normal.  Will discharge.  Informed patient to follow up with PCP. [BS]      ED Course User Index  [BS] Geoff Vizcaino MD                           Clinical Impression:  Final diagnoses:  [R53.83] Fatigue, unspecified type (Primary)          ED Disposition Condition    Discharge Stable          ED Prescriptions    None       Follow-up Information       Follow up With Specialties Details Why Contact Info    Briana Lamb, FNP Family Medicine Schedule an appointment as soon as possible for a visit   [address] [facility] [address] [phone]      [facility] - Emergency Dept Emergency Medicine Go to  If symptoms worsen [address]  [facility] [id number] [phone]               [1]   Social History  Tobacco Use    Smoking status: Never    Smokeless tobacco: Never   Substance Use Topics    Alcohol use: Yes     Comment: occasional    Drug use: Not Currently     Types: Marijuana        Geoff Vizcaino MD  03/24/25 1502

## 2025-03-24 NOTE — Clinical Note
"Evette Jang (Noelle) was seen and treated in our emergency department on 3/24/2025.  She may return to work on 03/26/2025.       If you have any questions or concerns, please don't hesitate to call.       RN    "

## 2025-05-06 ENCOUNTER — OFFICE VISIT (OUTPATIENT)
Dept: SURGERY | Facility: CLINIC | Age: 25
End: 2025-05-06
Payer: COMMERCIAL

## 2025-05-06 VITALS
RESPIRATION RATE: 20 BRPM | SYSTOLIC BLOOD PRESSURE: 111 MMHG | HEART RATE: 81 BPM | DIASTOLIC BLOOD PRESSURE: 74 MMHG | TEMPERATURE: 98 F | OXYGEN SATURATION: 99 % | WEIGHT: 160.63 LBS | HEIGHT: 61 IN | BODY MASS INDEX: 30.33 KG/M2

## 2025-05-06 DIAGNOSIS — K82.8 BILIARY DYSKINESIA: Primary | ICD-10-CM

## 2025-05-06 PROCEDURE — 3074F SYST BP LT 130 MM HG: CPT | Mod: CPTII,,, | Performed by: SURGERY

## 2025-05-06 PROCEDURE — 99213 OFFICE O/P EST LOW 20 MIN: CPT | Mod: S$PBB,,, | Performed by: SURGERY

## 2025-05-06 PROCEDURE — 1159F MED LIST DOCD IN RCRD: CPT | Mod: CPTII,,, | Performed by: SURGERY

## 2025-05-06 PROCEDURE — 3008F BODY MASS INDEX DOCD: CPT | Mod: CPTII,,, | Performed by: SURGERY

## 2025-05-06 PROCEDURE — 3078F DIAST BP <80 MM HG: CPT | Mod: CPTII,,, | Performed by: SURGERY

## 2025-05-06 PROCEDURE — 99215 OFFICE O/P EST HI 40 MIN: CPT | Mod: PBBFAC

## 2025-05-06 PROCEDURE — 3044F HG A1C LEVEL LT 7.0%: CPT | Mod: CPTII,,, | Performed by: SURGERY

## 2025-05-06 NOTE — PROGRESS NOTES
[facility] Note    HPI: Ms. Jang is an established patient of the clinic with a cholecystectomy planned for 3/7/2025 which she requested to cancel due to family scheduling conflicts. She is here today hoping to schedule surgery for mid May/ so her sister would be out of school and would be able to help take care of her if she was recovering from surgery.      She first started experiencing nausea and abdominal pain after eating in Aug-2024, and her PCP ordered an US abdomen which revealed small gallstones v. sludge and she was referred for evaluation by general surgery.     Today, she reports that her main symptom is diarrhea after eating. She states that she has cleaned up her diet recently and reduced her fast food intake (she works at [employer] and used to eat a lot of fast food because it was convenient). She states her abdominal pain has mostly resolved. She reports some nausea after eating, denies vomiting. No fever, chills, and recent weight change.     All other ROS as below.     PSHx of  only. States she is being worked up for possible autoimmune arthritis but has not started any medications.     PMH:   Past Medical History:   Diagnosis Date    ADHD (attention deficit hyperactivity disorder)     Anxiety     Depression     Screen for STD (sexually transmitted disease) 10/29/2024      Meds: Current Medications[1]  Allergies: Review of patient's allergies indicates:  No Known Allergies  Social History: Social History[2]  Family History:   Family History   Problem Relation Name Age of Onset    Depression Mother      Anxiety disorder Mother      No Known Problems Father      No Known Problems Sister       Surgical History:   Past Surgical History:   Procedure Laterality Date     SECTION       Review of Systems:  Skin: No rashes or itching.  Head: Denies headache or recent trauma.  Eyes: Denies eye pain or double vision.  Neck: Denies swelling or hoarseness of  voice.  Respiratory: Denies shortness of breath or chest pain  Cardiac: Denies palpitations or swelling in hands/feet.  Gastrointestinal: Denies vomiting. Reports nausea and diarrhea after eating. Stools are non bloody.  Urinary: Denies dysuria or hematuria.  Vascular: Denies claudication or leg swelling.  Neuro: Denies motor deficits. Denies weakness.  Endocrine: Denies excessive sweating or cold intolerance.  Psych: Denies memory problems. Denies anxiety.    Objective:    Vitals:  There were no vitals filed for this visit.     Physical Exam:  Gen: NAD  Neuro: awake, alert, answering questions appropriately  CV: RRR  Resp: non-labored breathing, VIV  Abd: soft, ND, NT. Mild diffuse tenderness, negative Hameed's sign, no tenderdess in RUQ on light or deep palpation.  : deferred  Ext: moves all 4 spontaneously and purposefully  Skin: warm, well perfused    Pertinent Labs:  CBC (3/24/25)  WBC: 10.85  Hgb: 15.0  Hct: 45.2  Plt: 330     CMP (3/24/25)  Na: 138  K: 4.0  Cl: 107  CO2: 23  BUN: 14.4  Cr: 0.81  Glucose: 106     HbA1c (25)   5.2     Lipid Panel (25)  Cholesterol: 187  HDL: 48  Triglycerides: 149 (H)  LDL: 109     Amylase (25)  47     Lipase (25)  32     CRP (25)  1.50    Imaging:  US Abdomen (24)  Impression  - Mild hepatic steatosis.  - Small stones versus sludge balls without evidence of cholecystitis.  Gallbladder Findings  - Gallbladder contains small stones or sludge balls. No wall thickening or sonographic Hameed sign. Common bile duct measures 0.3 cm.     Micro/Path/Other:  None    Assessment/Plan:  25 year old female with no past medical history, past surgical history of . Here today to reschedule gallbladder surgery; on interview and physical exam, concern that symptoms may not be gallbladder related. Main symptoms are chronic diarrhea (since Aug/Sept 2024) with no reports of greasy, floating stools. Mild nausea after eating.     - HIDA scan to assess  gallbladder function  - GI referral for workup for chronic diarrhea  - RTC in 3 weeks following DONNELL Pena, MS-3  [facility] [employer]  Surgery    RESIDENT ATTESTATION    I have seen and evaluated the patient with the student doctor. I agree with the plan as written and made adjustments as needed.     Pb Noble MD  [facility] General Surgery PGY-3  8:55 AM           [1]   Current Outpatient Medications:     albuterol (PROVENTIL/VENTOLIN HFA) 90 mcg/actuation inhaler, Inhale 2 puffs into the lungs every 4 (four) hours as needed., Disp: , Rfl:     cetirizine (ZYRTEC) 10 MG tablet, Take 1 tablet (10 mg total) by mouth once daily., Disp: 30 tablet, Rfl: 0    famotidine (PEPCID) 40 MG tablet, Take 1 tablet (40 mg total) by mouth once daily., Disp: 30 tablet, Rfl: 11    fluticasone propionate (FLONASE) 50 mcg/actuation nasal spray, 1 spray by Each Nostril route Daily., Disp: , Rfl:     hydrOXYzine pamoate (VISTARIL) 25 MG Cap, Take 1 capsule (25 mg total) by mouth every 8 (eight) hours as needed (Itching). (Patient not taking: Reported on 3/18/2025), Disp: 15 capsule, Rfl: 0    ibuprofen (ADVIL,MOTRIN) 600 MG tablet, Take 1 tablet (600 mg total) by mouth every 6 (six) hours as needed for Pain., Disp: 20 tablet, Rfl: 0    levonorgestreL (MIRENA) 21 mcg/24 hours (8 yrs) 52 mg IUD, 52 mg by Intrauterine route., Disp: , Rfl:     sertraline (ZOLOFT) 100 MG tablet, Take 1 tablet (100 mg total) by mouth once daily., Disp: 90 tablet, Rfl: 1    triamcinolone acetonide 0.1% (KENALOG) 0.1 % cream, Apply topically 2 (two) times daily. for 5 days, Disp: 15 g, Rfl: 0    valACYclovir (VALTREX) 500 MG tablet, Take 500 mg by mouth once daily., Disp: , Rfl:   [2]   Social History  Tobacco Use    Smoking status: Never    Smokeless tobacco: Never   Substance Use Topics    Alcohol use: Yes     Comment: occasional    Drug use: Not Currently     Types: Marijuana

## 2025-05-06 NOTE — PROGRESS NOTES
I have reviewed the notes, assessments, and/or procedures performed by the resident, I concur with her/his documentation of Evette Jang.     Nata Thomson MD   Patient would like to have His DOT px with Dr. Sanchez on the same day as his annual px with dr. Bolanos on 11/17/21. Please contact patient with more info of DOT cost.  Cell: [phone]

## 2025-05-06 NOTE — PROGRESS NOTES
Seen by Dr. Noble RTCHATA 3 weeks with HIDA Scan GI referral put in can come back before GI apt.

## 2025-06-02 ENCOUNTER — HOSPITAL ENCOUNTER (OUTPATIENT)
Dept: RADIOLOGY | Facility: HOSPITAL | Age: 25
Discharge: HOME | End: 2025-06-02
Attending: STUDENT IN AN ORGANIZED HEALTH CARE EDUCATION/TRAINING PROGRAM
Payer: COMMERCIAL

## 2025-06-02 DIAGNOSIS — K82.8 BILIARY DYSKINESIA: ICD-10-CM

## 2025-06-11 ENCOUNTER — OFFICE VISIT (OUTPATIENT)
Dept: BEHAVIORAL HEALTH | Facility: CLINIC | Age: 25
End: 2025-06-11
Payer: COMMERCIAL

## 2025-06-11 VITALS
WEIGHT: 162.69 LBS | SYSTOLIC BLOOD PRESSURE: 120 MMHG | DIASTOLIC BLOOD PRESSURE: 82 MMHG | BODY MASS INDEX: 30.71 KG/M2 | HEIGHT: 61 IN

## 2025-06-11 DIAGNOSIS — F33.2 SEVERE EPISODE OF RECURRENT MAJOR DEPRESSIVE DISORDER, WITHOUT PSYCHOTIC FEATURES: Primary | Chronic | ICD-10-CM

## 2025-06-11 DIAGNOSIS — F90.0 ATTENTION DEFICIT HYPERACTIVITY DISORDER (ADHD), PREDOMINANTLY INATTENTIVE TYPE: ICD-10-CM

## 2025-06-11 DIAGNOSIS — F32.A ANXIETY AND DEPRESSION: ICD-10-CM

## 2025-06-11 DIAGNOSIS — F41.1 GAD (GENERALIZED ANXIETY DISORDER): Chronic | ICD-10-CM

## 2025-06-11 DIAGNOSIS — F41.9 ANXIETY AND DEPRESSION: ICD-10-CM

## 2025-06-11 DIAGNOSIS — F51.04 PSYCHOPHYSIOLOGICAL INSOMNIA: Chronic | ICD-10-CM

## 2025-06-11 PROBLEM — G47.00 INSOMNIA: Chronic | Status: ACTIVE | Noted: 2025-06-11

## 2025-06-11 PROBLEM — F32.9 MDD (MAJOR DEPRESSIVE DISORDER): Chronic | Status: ACTIVE | Noted: 2025-06-11

## 2025-06-11 PROCEDURE — 99214 OFFICE O/P EST MOD 30 MIN: CPT | Mod: PBBFAC,PN | Performed by: NURSE PRACTITIONER

## 2025-06-11 RX ORDER — LISDEXAMFETAMINE DIMESYLATE 30 MG/1
30 CAPSULE ORAL EVERY MORNING
Qty: 31 CAPSULE | Refills: 0 | Status: SHIPPED | OUTPATIENT
Start: 2025-07-23

## 2025-06-11 RX ORDER — LISDEXAMFETAMINE DIMESYLATE 30 MG/1
30 CAPSULE ORAL EVERY MORNING
Qty: 30 CAPSULE | Refills: 0 | Status: SHIPPED | OUTPATIENT
Start: 2025-06-23 | End: 2025-06-11 | Stop reason: SDUPTHER

## 2025-06-11 RX ORDER — LISDEXAMFETAMINE DIMESYLATE 30 MG/1
30 CAPSULE ORAL EVERY MORNING
Qty: 31 CAPSULE | Refills: 0 | Status: SHIPPED | OUTPATIENT
Start: 2025-07-23 | End: 2025-06-11 | Stop reason: SDUPTHER

## 2025-06-11 RX ORDER — LISDEXAMFETAMINE DIMESYLATE 30 MG/1
30 CAPSULE ORAL EVERY MORNING
Qty: 31 CAPSULE | Refills: 0 | Status: SHIPPED | OUTPATIENT
Start: 2025-08-23

## 2025-06-11 RX ORDER — LISDEXAMFETAMINE DIMESYLATE 30 MG/1
30 CAPSULE ORAL EVERY MORNING
Qty: 30 CAPSULE | Refills: 0 | Status: SHIPPED | OUTPATIENT
Start: 2025-06-23 | End: 2025-07-23

## 2025-06-11 RX ORDER — LISDEXAMFETAMINE DIMESYLATE 30 MG/1
30 CAPSULE ORAL EVERY MORNING
Qty: 31 CAPSULE | Refills: 0 | Status: SHIPPED | OUTPATIENT
Start: 2025-08-23 | End: 2025-06-11 | Stop reason: SDUPTHER

## 2025-06-11 NOTE — PROGRESS NOTES
Initial Interview  06/11/2025  HPI: Evette Jang is a 25 y.o. female here today for a psychiatric evaluation referred by PCP to the [facility] for evaluation and medication management of MDD, EZEKIEL, and insomnia  Past Medical History:     -------------------------------------    ADHD (attention deficit hyperactivity disorder)    Anxiety    Depression    Screen for STD (sexually transmitted disease)     Medications:   Current Outpatient Medications   Medication Instructions    albuterol (PROVENTIL/VENTOLIN HFA) 90 mcg/actuation inhaler 2 puffs, Every 4 hours PRN    cetirizine (ZYRTEC) 10 mg, Oral, Daily    famotidine (PEPCID) 40 mg, Oral, Daily    fluticasone propionate (FLONASE) 50 mcg/actuation nasal spray 1 spray, Daily    hydrOXYzine pamoate (VISTARIL) 25 mg, Oral, Every 8 hours PRN    ibuprofen (ADVIL,MOTRIN) 600 mg, Oral, Every 6 hours PRN    levonorgestreL (MIRENA) 52 mg    [START ON 6/23/2025] lisdexamfetamine (VYVANSE) 30 mg, Oral, Every morning    [START ON 7/23/2025] lisdexamfetamine (VYVANSE) 30 mg, Oral, Every morning    [START ON 8/23/2025] lisdexamfetamine (VYVANSE) 30 mg, Oral, Every morning    triamcinolone acetonide 0.1% (KENALOG) 0.1 % cream Topical (Top), 2 times daily    valACYclovir (VALTREX) 500 mg, Daily     Chief complaint:  Medication management of depression and anxiety  Onset:  Chronic  Precipitating factors include personal, family, financial, and housing stressors  Persistent symptoms include depression, worry/anxiety/panic, grief, insomnia       Patient states that initially she needed medication but now she would like to switch  providers. She states that she had been seeing Lexie Almaraz.   She states that this clinic is closer to home.     She is currently taking Zoloft 150 mg a day for depression and anxiety and Vistaril 25mg at HS for insomnia.  She admits that she is not taking the Zoloft every day; she may miss 2-3 days. She denies having any discontinuation side effects  when she misses doses which causes this provider to suspect that maybe she is not taking the medication as regularly as she states that she is.   Nevertheless, she feels that the Zoloft is not helpful.   Because Prozac has a longer half life, will discontinue the Zoloft and start Prozac 20mg a day.   She requests a non-benzo that she can take as needed for anxiety. Will start Buspar 5mg TID.    States that she is also taking Vyvanse 30mg a day as needed for ADHD.   She has been taking Vyvanse since middle school.   Does not necessarily take every day.    She works PT at [employer]    She has a 1yo son and his father is not active in their lives; he does work but does not provide child support. She has filed the necessary papers to have him provide child support.     She is currently in 1:1 therapy at [facility] with Bere. She is addressing her anxiety and depression. She feels that it is helpful.    Psychiatric History:   Reports a prior psychiatric history:  MDD, EZEKIEL, OCD, ADHD  History of mental health out-patient treatment:  Unanswered  History of in-patient psychiatric hospitalization:  On answered  History of suicidal ideations:  in high school  History of suicidal threats: she does not remember but does not believe that she did  History of suicide attempts:  denies  History of self mutilation: in middle and early in high school - cut. Stopped early in high school    History of psychotropic medications:   Zoloft    Family Psychiatric History:  Depression:  Mother  Anxiety: Mother  Alcohol abuse/addiction:  Mother, grandparents  Drug addiction:     Substance Use History:  Patient denies any history of drug or alcohol abuse or addiction  Alcohol: occasionally - one drink maybe once a month  Marijuana: in the past; high school and after high school before she got pregnant  Benzodiazepines: denies  Opiates: denies  Stimulants: she has a Rx for Vyvanse  Cocaine: denies  Methamphetamine: denies  Nicotine:  denies  Caffeine: states that she tries not to drink a lot of caffiene. She drinks maybe once a week.     Social History:   Grew up in:  [facility]  Raised by:  Mother  Number of siblings:  1 half sibling  Education:  Completed the 12th grade  Employment:  Employed  Marital Status:  Single  Children:  1yo son  Living situation:  In a house with her mother and son  Hoahaoism affiliation:     Trauma History:  History of Emotional/Mental abuse:  Endorses  History of Physical abuse: denies  History of Sexual abuse: she is not sure  History of other trauma:     Legal History:  Legal history:  Denies  Denies being on probation or parole  Denies any upcoming court dates  Denies any pending charges.    PHQ Score:   06/11/2025:  20 severe    EZEKIEL-7 Score:   06/11/2025:  14 moderate    Mental Status Evaluation:  Appearance:  unremarkable, age appropriate   Behavior:  normal, cooperative   Speech:  no latency; no press   Mood:  dysthymic   Affect:  congruent and appropriate   Thought Process:  normal and logical   Thought Content:  normal, no suicidality, no homicidality, delusions, or paranoia   Sensorium:  grossly intact   Cognition:  grossly intact   Insight:  intact   Judgment:  behavior is adequate to circumstances     Impression:      ICD-10-CM ICD-9-CM   1. Severe episode of recurrent major depressive disorder, without psychotic features  F33.2 296.33   2. EZEKIEL (generalized anxiety disorder)  F41.1 300.02   3. Anxiety and depression  F41.9 300.00    F32.A 311   4. Psychophysiological insomnia  F51.04 307.42   5. Attention deficit hyperactivity disorder (ADHD), predominantly inattentive type  F90.0 314.00        Plan:  1. Take Zoloft 100mg a day x 7 days and then 50mg a day x 7 days and then stop  2. Start Prozac 20mg a day   3. Start Buspar 5mg TID as needed for anxiety/panic  4. Continue Vyvanse 30mg a day for ADHD  5. Continue 1:1 therapy    No need for PEC as pt is not an imminent danger to self or others or  gravely disabled due to acute psychiatric illness     Discussed that pt should either call clinic for psychiatric crisis symptoms or present to nearest emergency room    Discussed with patient informed consent including diagnosis, risks and benefits of proposed treatment above vs. alternative treatments vs. no treatment, as well as serious and common side effects of these treatments, and the inherent unpredictability of individual responses to these treatments. The patient expresses understanding of the above and displays the capacity to agree with this current plan. Patient also agrees that, currently, the benefits outweigh the risks and would like to pursue treatment at this time, and had no other questions.     Instructions:  Take all medications as prescribed.    2. Abstain from recreational drugs and alcohol.  3. Present to ED or call 911 for SI/HI plan or intent, psychosis, or medical emergency.     Return to Clinic: Follow up if symptoms worsen or fail to improve.    Follow up in about 3 months (around 9/18/2025) for medication management, face to face.

## 2025-07-08 RX ORDER — BUSPIRONE HYDROCHLORIDE 5 MG/1
5 TABLET ORAL 3 TIMES DAILY
COMMUNITY
End: 2025-07-08 | Stop reason: SDUPTHER

## 2025-07-08 RX ORDER — BUSPIRONE HYDROCHLORIDE 5 MG/1
5 TABLET ORAL 3 TIMES DAILY
Qty: 270 TABLET | Refills: 1 | Status: SHIPPED | OUTPATIENT
Start: 2025-07-08

## 2025-07-08 NOTE — TELEPHONE ENCOUNTER
Please see attached refill request.    Next scheduled office visit: 9/15/2025.    Last office visit: 6/11/2025.     Thank you!

## 2025-07-09 ENCOUNTER — PATIENT MESSAGE (OUTPATIENT)
Facility: CLINIC | Age: 25
End: 2025-07-09
Payer: COMMERCIAL

## 2025-07-30 ENCOUNTER — TELEPHONE (OUTPATIENT)
Dept: FAMILY MEDICINE | Facility: CLINIC | Age: 25
End: 2025-07-30
Payer: COMMERCIAL

## 2025-08-09 ENCOUNTER — HOSPITAL ENCOUNTER (EMERGENCY)
Facility: HOSPITAL | Age: 25
Discharge: HOME | End: 2025-08-09
Attending: FAMILY MEDICINE
Payer: COMMERCIAL

## 2025-08-09 VITALS
HEIGHT: 61 IN | TEMPERATURE: 98 F | RESPIRATION RATE: 20 BRPM | HEART RATE: 85 BPM | DIASTOLIC BLOOD PRESSURE: 82 MMHG | OXYGEN SATURATION: 99 % | BODY MASS INDEX: 30.21 KG/M2 | WEIGHT: 160 LBS | SYSTOLIC BLOOD PRESSURE: 125 MMHG

## 2025-08-09 DIAGNOSIS — J06.9 VIRAL URI WITH COUGH: Primary | ICD-10-CM

## 2025-08-09 PROCEDURE — 99282 EMERGENCY DEPT VISIT SF MDM: CPT

## 2025-08-09 PROCEDURE — 87636 SARSCOV2 & INF A&B AMP PRB: CPT

## 2025-08-09 PROCEDURE — 87651 STREP A DNA AMP PROBE: CPT

## 2025-08-09 RX ORDER — CETIRIZINE HYDROCHLORIDE 10 MG/1
10 TABLET ORAL DAILY
Qty: 30 TABLET | Refills: 0 | Status: SHIPPED | OUTPATIENT
Start: 2025-08-09 | End: 2025-09-08

## 2025-08-09 RX ORDER — FLUTICASONE PROPIONATE 50 MCG
1 SPRAY, SUSPENSION (ML) NASAL 2 TIMES DAILY PRN
Qty: 15 G | Refills: 0 | Status: SHIPPED | OUTPATIENT
Start: 2025-08-09